# Patient Record
Sex: MALE | Race: WHITE | NOT HISPANIC OR LATINO | Employment: UNEMPLOYED | URBAN - METROPOLITAN AREA
[De-identification: names, ages, dates, MRNs, and addresses within clinical notes are randomized per-mention and may not be internally consistent; named-entity substitution may affect disease eponyms.]

---

## 2017-05-26 DIAGNOSIS — R53.1 WEAK: ICD-10-CM

## 2017-05-26 LAB — EKG IMPRESSION: NORMAL

## 2017-06-26 DIAGNOSIS — R07.89 CHEST WALL PAIN: ICD-10-CM

## 2017-06-30 LAB — EKG IMPRESSION: NORMAL

## 2018-02-19 ENCOUNTER — APPOINTMENT (OUTPATIENT)
Dept: RADIOLOGY | Facility: MEDICAL CENTER | Age: 58
DRG: 871 | End: 2018-02-19
Attending: EMERGENCY MEDICINE
Payer: COMMERCIAL

## 2018-02-19 ENCOUNTER — HOSPITAL ENCOUNTER (OUTPATIENT)
Dept: RADIOLOGY | Facility: MEDICAL CENTER | Age: 58
End: 2018-02-19

## 2018-02-19 ENCOUNTER — APPOINTMENT (OUTPATIENT)
Dept: RADIOLOGY | Facility: MEDICAL CENTER | Age: 58
DRG: 871 | End: 2018-02-19
Attending: INTERNAL MEDICINE
Payer: COMMERCIAL

## 2018-02-19 ENCOUNTER — RESOLUTE PROFESSIONAL BILLING HOSPITAL PROF FEE (OUTPATIENT)
Dept: HOSPITALIST | Facility: MEDICAL CENTER | Age: 58
End: 2018-02-19
Payer: COMMERCIAL

## 2018-02-19 ENCOUNTER — HOSPITAL ENCOUNTER (INPATIENT)
Facility: MEDICAL CENTER | Age: 58
LOS: 7 days | DRG: 871 | End: 2018-02-26
Attending: EMERGENCY MEDICINE | Admitting: INTERNAL MEDICINE
Payer: COMMERCIAL

## 2018-02-19 PROBLEM — J18.9 CAP (COMMUNITY ACQUIRED PNEUMONIA): Status: ACTIVE | Noted: 2018-02-19

## 2018-02-19 PROBLEM — I25.10 CAD (CORONARY ARTERY DISEASE): Status: ACTIVE | Noted: 2018-02-19

## 2018-02-19 PROBLEM — R74.01 TRANSAMINITIS: Status: ACTIVE | Noted: 2018-02-19

## 2018-02-19 PROBLEM — I50.9 ACUTE CHF (HCC): Status: ACTIVE | Noted: 2018-02-19

## 2018-02-19 PROBLEM — A41.9 SEPSIS (HCC): Status: ACTIVE | Noted: 2018-02-19

## 2018-02-19 LAB
ALBUMIN SERPL BCP-MCNC: 3.5 G/DL (ref 3.2–4.9)
ALBUMIN/GLOB SERPL: 1.1 G/DL
ALP SERPL-CCNC: 215 U/L (ref 30–99)
ALT SERPL-CCNC: 490 U/L (ref 2–50)
ANION GAP SERPL CALC-SCNC: 13 MMOL/L (ref 0–11.9)
APPEARANCE UR: CLEAR
AST SERPL-CCNC: 470 U/L (ref 12–45)
BASOPHILS # BLD AUTO: 0.5 % (ref 0–1.8)
BASOPHILS # BLD: 0.06 K/UL (ref 0–0.12)
BILIRUB SERPL-MCNC: 1.3 MG/DL (ref 0.1–1.5)
BILIRUB UR QL STRIP.AUTO: NEGATIVE
BNP SERPL-MCNC: 2730 PG/ML (ref 0–100)
BUN SERPL-MCNC: 14 MG/DL (ref 8–22)
CALCIUM SERPL-MCNC: 8.7 MG/DL (ref 8.5–10.5)
CHLORIDE SERPL-SCNC: 106 MMOL/L (ref 96–112)
CO2 SERPL-SCNC: 23 MMOL/L (ref 20–33)
COLOR UR: YELLOW
CREAT SERPL-MCNC: 0.96 MG/DL (ref 0.5–1.4)
EOSINOPHIL # BLD AUTO: 0.03 K/UL (ref 0–0.51)
EOSINOPHIL NFR BLD: 0.2 % (ref 0–6.9)
ERYTHROCYTE [DISTWIDTH] IN BLOOD BY AUTOMATED COUNT: 55.9 FL (ref 35.9–50)
GLOBULIN SER CALC-MCNC: 3.3 G/DL (ref 1.9–3.5)
GLUCOSE SERPL-MCNC: 90 MG/DL (ref 65–99)
GLUCOSE UR STRIP.AUTO-MCNC: NEGATIVE MG/DL
HCT VFR BLD AUTO: 40.6 % (ref 42–52)
HGB BLD-MCNC: 13.1 G/DL (ref 14–18)
IMM GRANULOCYTES # BLD AUTO: 0.09 K/UL (ref 0–0.11)
IMM GRANULOCYTES NFR BLD AUTO: 0.7 % (ref 0–0.9)
KETONES UR STRIP.AUTO-MCNC: NEGATIVE MG/DL
LACTATE BLD-SCNC: 2.1 MMOL/L (ref 0.5–2)
LACTATE BLD-SCNC: 2.6 MMOL/L (ref 0.5–2)
LACTATE BLD-SCNC: 2.8 MMOL/L (ref 0.5–2)
LEUKOCYTE ESTERASE UR QL STRIP.AUTO: NEGATIVE
LYMPHOCYTES # BLD AUTO: 1.03 K/UL (ref 1–4.8)
LYMPHOCYTES NFR BLD: 7.9 % (ref 22–41)
MCH RBC QN AUTO: 29.4 PG (ref 27–33)
MCHC RBC AUTO-ENTMCNC: 32.3 G/DL (ref 33.7–35.3)
MCV RBC AUTO: 91 FL (ref 81.4–97.8)
MICRO URNS: NORMAL
MONOCYTES # BLD AUTO: 0.92 K/UL (ref 0–0.85)
MONOCYTES NFR BLD AUTO: 7.1 % (ref 0–13.4)
NEUTROPHILS # BLD AUTO: 10.83 K/UL (ref 1.82–7.42)
NEUTROPHILS NFR BLD: 83.6 % (ref 44–72)
NITRITE UR QL STRIP.AUTO: NEGATIVE
NRBC # BLD AUTO: 0 K/UL
NRBC BLD-RTO: 0 /100 WBC
PH UR STRIP.AUTO: 7 [PH]
PLATELET # BLD AUTO: 269 K/UL (ref 164–446)
PMV BLD AUTO: 10.8 FL (ref 9–12.9)
POTASSIUM SERPL-SCNC: 4.4 MMOL/L (ref 3.6–5.5)
PROT SERPL-MCNC: 6.8 G/DL (ref 6–8.2)
PROT UR QL STRIP: NEGATIVE MG/DL
RBC # BLD AUTO: 4.46 M/UL (ref 4.7–6.1)
RBC UR QL AUTO: NEGATIVE
SODIUM SERPL-SCNC: 142 MMOL/L (ref 135–145)
SP GR UR STRIP.AUTO: 1.01
TROPONIN I SERPL-MCNC: 0.09 NG/ML (ref 0–0.04)
TROPONIN I SERPL-MCNC: 0.12 NG/ML (ref 0–0.04)
UROBILINOGEN UR STRIP.AUTO-MCNC: 0.2 MG/DL
WBC # BLD AUTO: 13 K/UL (ref 4.8–10.8)

## 2018-02-19 PROCEDURE — 700102 HCHG RX REV CODE 250 W/ 637 OVERRIDE(OP): Performed by: INTERNAL MEDICINE

## 2018-02-19 PROCEDURE — A9270 NON-COVERED ITEM OR SERVICE: HCPCS | Performed by: INTERNAL MEDICINE

## 2018-02-19 PROCEDURE — 80048 BASIC METABOLIC PNL TOTAL CA: CPT

## 2018-02-19 PROCEDURE — 99285 EMERGENCY DEPT VISIT HI MDM: CPT

## 2018-02-19 PROCEDURE — 700111 HCHG RX REV CODE 636 W/ 250 OVERRIDE (IP): Performed by: EMERGENCY MEDICINE

## 2018-02-19 PROCEDURE — 93005 ELECTROCARDIOGRAM TRACING: CPT | Performed by: EMERGENCY MEDICINE

## 2018-02-19 PROCEDURE — 304561 HCHG STAT O2

## 2018-02-19 PROCEDURE — 85025 COMPLETE CBC W/AUTO DIFF WBC: CPT

## 2018-02-19 PROCEDURE — 83605 ASSAY OF LACTIC ACID: CPT

## 2018-02-19 PROCEDURE — 700111 HCHG RX REV CODE 636 W/ 250 OVERRIDE (IP): Performed by: INTERNAL MEDICINE

## 2018-02-19 PROCEDURE — 99223 1ST HOSP IP/OBS HIGH 75: CPT | Mod: 25 | Performed by: INTERNAL MEDICINE

## 2018-02-19 PROCEDURE — 81003 URINALYSIS AUTO W/O SCOPE: CPT

## 2018-02-19 PROCEDURE — 96365 THER/PROPH/DIAG IV INF INIT: CPT

## 2018-02-19 PROCEDURE — 80053 COMPREHEN METABOLIC PANEL: CPT

## 2018-02-19 PROCEDURE — 71045 X-RAY EXAM CHEST 1 VIEW: CPT

## 2018-02-19 PROCEDURE — 700105 HCHG RX REV CODE 258: Performed by: EMERGENCY MEDICINE

## 2018-02-19 PROCEDURE — 87040 BLOOD CULTURE FOR BACTERIA: CPT

## 2018-02-19 PROCEDURE — 36415 COLL VENOUS BLD VENIPUNCTURE: CPT

## 2018-02-19 PROCEDURE — 87086 URINE CULTURE/COLONY COUNT: CPT

## 2018-02-19 PROCEDURE — 96361 HYDRATE IV INFUSION ADD-ON: CPT

## 2018-02-19 PROCEDURE — 99407 BEHAV CHNG SMOKING > 10 MIN: CPT | Performed by: INTERNAL MEDICINE

## 2018-02-19 PROCEDURE — 770020 HCHG ROOM/CARE - TELE (206)

## 2018-02-19 PROCEDURE — 700105 HCHG RX REV CODE 258: Performed by: INTERNAL MEDICINE

## 2018-02-19 PROCEDURE — A9270 NON-COVERED ITEM OR SERVICE: HCPCS | Performed by: EMERGENCY MEDICINE

## 2018-02-19 PROCEDURE — 84484 ASSAY OF TROPONIN QUANT: CPT

## 2018-02-19 PROCEDURE — 83880 ASSAY OF NATRIURETIC PEPTIDE: CPT

## 2018-02-19 PROCEDURE — 700102 HCHG RX REV CODE 250 W/ 637 OVERRIDE(OP): Performed by: EMERGENCY MEDICINE

## 2018-02-19 PROCEDURE — 80061 LIPID PANEL: CPT

## 2018-02-19 RX ORDER — ACETAMINOPHEN 325 MG/1
650 TABLET ORAL EVERY 6 HOURS PRN
Status: DISCONTINUED | OUTPATIENT
Start: 2018-02-19 | End: 2018-02-26 | Stop reason: HOSPADM

## 2018-02-19 RX ORDER — FUROSEMIDE 10 MG/ML
20 INJECTION INTRAMUSCULAR; INTRAVENOUS
Status: DISCONTINUED | OUTPATIENT
Start: 2018-02-19 | End: 2018-02-19

## 2018-02-19 RX ORDER — IPRATROPIUM BROMIDE AND ALBUTEROL SULFATE 2.5; .5 MG/3ML; MG/3ML
3 SOLUTION RESPIRATORY (INHALATION)
Status: DISCONTINUED | OUTPATIENT
Start: 2018-02-19 | End: 2018-02-26 | Stop reason: HOSPADM

## 2018-02-19 RX ORDER — OMEPRAZOLE 20 MG/1
20 CAPSULE, DELAYED RELEASE ORAL
Status: DISCONTINUED | OUTPATIENT
Start: 2018-02-19 | End: 2018-02-26 | Stop reason: HOSPADM

## 2018-02-19 RX ORDER — NICOTINE 21 MG/24HR
14 PATCH, TRANSDERMAL 24 HOURS TRANSDERMAL
Status: DISCONTINUED | OUTPATIENT
Start: 2018-02-20 | End: 2018-02-26 | Stop reason: HOSPADM

## 2018-02-19 RX ORDER — SODIUM CHLORIDE 9 MG/ML
500 INJECTION, SOLUTION INTRAVENOUS
Status: COMPLETED | OUTPATIENT
Start: 2018-02-19 | End: 2018-02-20

## 2018-02-19 RX ORDER — LEVOTHYROXINE SODIUM 137 UG/1
137 TABLET ORAL
Status: ON HOLD | COMMUNITY
End: 2018-02-26

## 2018-02-19 RX ORDER — ONDANSETRON 2 MG/ML
4 INJECTION INTRAMUSCULAR; INTRAVENOUS EVERY 4 HOURS PRN
Status: DISCONTINUED | OUTPATIENT
Start: 2018-02-19 | End: 2018-02-26 | Stop reason: HOSPADM

## 2018-02-19 RX ORDER — ASPIRIN 325 MG
325 TABLET ORAL ONCE
Status: COMPLETED | OUTPATIENT
Start: 2018-02-19 | End: 2018-02-19

## 2018-02-19 RX ORDER — AMOXICILLIN 250 MG
2 CAPSULE ORAL 2 TIMES DAILY
Status: DISCONTINUED | OUTPATIENT
Start: 2018-02-19 | End: 2018-02-26 | Stop reason: HOSPADM

## 2018-02-19 RX ORDER — SODIUM CHLORIDE 9 MG/ML
INJECTION, SOLUTION INTRAVENOUS
Status: ACTIVE
Start: 2018-02-19 | End: 2018-02-20

## 2018-02-19 RX ORDER — LEVOTHYROXINE SODIUM 0.1 MG/1
100 TABLET ORAL
Status: ON HOLD | COMMUNITY
End: 2018-02-26

## 2018-02-19 RX ORDER — SODIUM CHLORIDE 9 MG/ML
30 INJECTION, SOLUTION INTRAVENOUS ONCE
Status: COMPLETED | OUTPATIENT
Start: 2018-02-19 | End: 2018-02-19

## 2018-02-19 RX ORDER — HYDROCODONE BITARTRATE AND ACETAMINOPHEN 10; 325 MG/1; MG/1
1-2 TABLET ORAL EVERY 6 HOURS PRN
COMMUNITY
End: 2022-11-26

## 2018-02-19 RX ORDER — AZITHROMYCIN 250 MG/1
250 TABLET, FILM COATED ORAL DAILY
Status: COMPLETED | OUTPATIENT
Start: 2018-02-20 | End: 2018-02-23

## 2018-02-19 RX ORDER — TAMSULOSIN HYDROCHLORIDE 0.4 MG/1
0.4 CAPSULE ORAL
COMMUNITY

## 2018-02-19 RX ORDER — TAMSULOSIN HYDROCHLORIDE 0.4 MG/1
0.4 CAPSULE ORAL
Status: DISCONTINUED | OUTPATIENT
Start: 2018-02-19 | End: 2018-02-26 | Stop reason: HOSPADM

## 2018-02-19 RX ORDER — LEVOTHYROXINE SODIUM 0.1 MG/1
100 TABLET ORAL
Status: DISCONTINUED | OUTPATIENT
Start: 2018-02-20 | End: 2018-02-19

## 2018-02-19 RX ORDER — TERAZOSIN 2 MG/1
2 CAPSULE ORAL
Status: ON HOLD | COMMUNITY
End: 2018-02-26

## 2018-02-19 RX ORDER — BISACODYL 10 MG
10 SUPPOSITORY, RECTAL RECTAL
Status: DISCONTINUED | OUTPATIENT
Start: 2018-02-19 | End: 2018-02-26 | Stop reason: HOSPADM

## 2018-02-19 RX ORDER — POLYETHYLENE GLYCOL 3350 17 G/17G
1 POWDER, FOR SOLUTION ORAL
Status: DISCONTINUED | OUTPATIENT
Start: 2018-02-19 | End: 2018-02-26 | Stop reason: HOSPADM

## 2018-02-19 RX ORDER — SIMVASTATIN 20 MG
20 TABLET ORAL
Status: DISCONTINUED | OUTPATIENT
Start: 2018-02-19 | End: 2018-02-20

## 2018-02-19 RX ORDER — LEVOTHYROXINE SODIUM 137 UG/1
137 TABLET ORAL
Status: DISCONTINUED | OUTPATIENT
Start: 2018-02-20 | End: 2018-02-19

## 2018-02-19 RX ORDER — OMEPRAZOLE 20 MG/1
20 CAPSULE, DELAYED RELEASE ORAL 2 TIMES DAILY
COMMUNITY

## 2018-02-19 RX ORDER — AZITHROMYCIN 250 MG/1
500 TABLET, FILM COATED ORAL ONCE
Status: COMPLETED | OUTPATIENT
Start: 2018-02-19 | End: 2018-02-19

## 2018-02-19 RX ORDER — SIMVASTATIN 20 MG
20 TABLET ORAL
Status: ON HOLD | COMMUNITY
End: 2018-02-26

## 2018-02-19 RX ORDER — LEVOTHYROXINE SODIUM 0.1 MG/1
100 TABLET ORAL
Status: DISCONTINUED | OUTPATIENT
Start: 2018-02-19 | End: 2018-02-20

## 2018-02-19 RX ORDER — BUDESONIDE AND FORMOTEROL FUMARATE DIHYDRATE 80; 4.5 UG/1; UG/1
2 AEROSOL RESPIRATORY (INHALATION)
Status: DISCONTINUED | OUTPATIENT
Start: 2018-02-19 | End: 2018-02-20

## 2018-02-19 RX ORDER — FOLIC ACID 1 MG/1
1 TABLET ORAL DAILY
COMMUNITY

## 2018-02-19 RX ORDER — TIOTROPIUM BROMIDE 18 UG/1
1 CAPSULE ORAL; RESPIRATORY (INHALATION)
Status: DISCONTINUED | OUTPATIENT
Start: 2018-02-19 | End: 2018-02-20

## 2018-02-19 RX ORDER — FOLIC ACID 1 MG/1
5 TABLET ORAL DAILY
Status: DISCONTINUED | OUTPATIENT
Start: 2018-02-19 | End: 2018-02-26 | Stop reason: HOSPADM

## 2018-02-19 RX ORDER — FUROSEMIDE 10 MG/ML
20 INJECTION INTRAMUSCULAR; INTRAVENOUS
Status: DISCONTINUED | OUTPATIENT
Start: 2018-02-19 | End: 2018-02-20

## 2018-02-19 RX ORDER — GABAPENTIN 400 MG/1
1200 CAPSULE ORAL
COMMUNITY
End: 2022-11-26

## 2018-02-19 RX ORDER — LEVOTHYROXINE SODIUM 137 UG/1
137 TABLET ORAL
Status: DISCONTINUED | OUTPATIENT
Start: 2018-02-19 | End: 2018-02-20

## 2018-02-19 RX ORDER — ONDANSETRON 4 MG/1
4 TABLET, ORALLY DISINTEGRATING ORAL EVERY 4 HOURS PRN
Status: DISCONTINUED | OUTPATIENT
Start: 2018-02-19 | End: 2018-02-26 | Stop reason: HOSPADM

## 2018-02-19 RX ADMIN — OMEPRAZOLE 20 MG: 20 CAPSULE, DELAYED RELEASE ORAL at 20:08

## 2018-02-19 RX ADMIN — TAMSULOSIN HYDROCHLORIDE 0.4 MG: 0.4 CAPSULE ORAL at 20:08

## 2018-02-19 RX ADMIN — FOLIC ACID 5 MG: 1 TABLET ORAL at 20:09

## 2018-02-19 RX ADMIN — SIMVASTATIN 20 MG: 20 TABLET, FILM COATED ORAL at 20:08

## 2018-02-19 RX ADMIN — SODIUM CHLORIDE 1959 ML: 9 INJECTION, SOLUTION INTRAVENOUS at 16:00

## 2018-02-19 RX ADMIN — AZITHROMYCIN 500 MG: 250 TABLET, FILM COATED ORAL at 20:08

## 2018-02-19 RX ADMIN — ASPIRIN 325 MG: 325 TABLET, FILM COATED ORAL at 17:09

## 2018-02-19 RX ADMIN — STANDARDIZED SENNA CONCENTRATE AND DOCUSATE SODIUM 2 TABLET: 8.6; 5 TABLET, FILM COATED ORAL at 20:09

## 2018-02-19 RX ADMIN — AMPICILLIN SODIUM AND SULBACTAM SODIUM 3 G: 2; 1 INJECTION, POWDER, FOR SOLUTION INTRAMUSCULAR; INTRAVENOUS at 22:34

## 2018-02-19 RX ADMIN — AMPICILLIN SODIUM AND SULBACTAM SODIUM 3 G: 2; 1 INJECTION, POWDER, FOR SOLUTION INTRAMUSCULAR; INTRAVENOUS at 17:09

## 2018-02-19 RX ADMIN — ENOXAPARIN SODIUM 40 MG: 100 INJECTION SUBCUTANEOUS at 20:09

## 2018-02-19 RX ADMIN — FUROSEMIDE 20 MG: 10 INJECTION, SOLUTION INTRAMUSCULAR; INTRAVENOUS at 20:10

## 2018-02-19 ASSESSMENT — PAIN SCALES - GENERAL
PAINLEVEL_OUTOF10: 0
PAINLEVEL_OUTOF10: 0

## 2018-02-19 ASSESSMENT — LIFESTYLE VARIABLES
EVER_SMOKED: YES
ALCOHOL_USE: NO

## 2018-02-19 ASSESSMENT — PATIENT HEALTH QUESTIONNAIRE - PHQ9
1. LITTLE INTEREST OR PLEASURE IN DOING THINGS: NOT AT ALL
SUM OF ALL RESPONSES TO PHQ QUESTIONS 1-9: 0
2. FEELING DOWN, DEPRESSED, IRRITABLE, OR HOPELESS: NOT AT ALL
SUM OF ALL RESPONSES TO PHQ9 QUESTIONS 1 AND 2: 0

## 2018-02-19 NOTE — ED TRIAGE NOTES
Pt. Presents to ED via [facility] from [place] [facility] with Pneumonia and COPD exacerbation.... Pt. Arrives on BiPAP.... Pt. Taken off BiPAP on arrival and placed on Nasal Cannula.... Respirations even and unlabored. aao x 4    Pt. Received the following at [place]  40mg Lasix IV  Nitro Drip (taken off on arrival to [facility]    Pt. Has Villareal Catheter placed... 1500mL drained from Villareal bag.

## 2018-02-19 NOTE — ED PROVIDER NOTES
"ED Provider Note    Scribed for Mike Alaniz D.O. by Tony Schilling. 2/19/2018  3:33 PM    Primary care provider: No primary care provider on file.  Means of arrival: EMS  History obtained from: patient  History limited by: none    CHIEF COMPLAINT  Chief Complaint   Patient presents with   • Shortness of Breath       HPI  Phillip Ann is a 57 y.o. male with a history of COPD who presents to the Emergency Department as a transfer from [place] for evaluation of pneumonia and complaining of gradually worsening shortness of breath for the last 2 days. Patient repots associated mild subjective fever, facial swelling, bloating. He uses 2 L home oxygen and states that he has had to recently increase to 3 L which improved his shortness of breath. Patient was evaluated in [place], [place] and transferred to [facility] for more definitive care. He has a history of MI, daily cigarette use. Patient has not been compliant with his home medications. He denies abdominal pain, chest pain, cough, history of liver failure, history of hepatitis.     REVIEW OF SYSTEMS  Pertinent positives include shortness of breath, subjective fever, facial swelling, bloating. Pertinent negatives include no abdominal pain, chest pain, cough.  All other systems reviewed and negative.  C.    PAST MEDICAL HISTORY  COPD    SURGICAL HISTORY  None noted     SOCIAL HISTORY  Social History   Substance Use Topics   • Smoking status: Daily user   • Smokeless tobacco: None noted   • Alcohol use None noted      History   Drug use: None noted       FAMILY HISTORY  None noted    CURRENT MEDICATIONS  No current facility-administered medications for this encounter.   No current outpatient prescriptions on file.    ALLERGIES  Allergies   Allergen Reactions   • Hydromorphone Itching, Vomiting and Nausea       PHYSICAL EXAM  VITAL SIGNS: /89   Pulse (!) 104   Temp 36 °C (96.8 °F)   Resp 20   Ht 1.702 m (5' 7\")   Wt 65.3 kg (144 lb)   SpO2 96%   BMI 22.55 " kg/m²     Nursing notes and vitals reviewed.  Constitutional: Well developed, Well nourished, Non-toxic appearance. Slight distress  HENT: Dry mucousa.   Eyes: PERRLA, EOMI, Conjunctiva normal, No discharge.   Cardiovascular: Tachycardic heart rate, Normal rhythm, No murmurs, No rubs, No gallops.   Thorax & Lungs: No respiratory distress, No chest tenderness. Wheezes rales and rhonchi bilaterally. Dramatic decreased air movment bilaterally  Abdomen: Bowel sounds normal, Soft, No tenderness, No guarding, No rebound, No masses, No pulsatile masses.   Skin: Warm, Dry, No erythema, No rash.   Musculoskeletal: Intact distal pulses, No edema, No cyanosis, No clubbing. Good range of motion in all major joints. No tenderness to palpation or major deformities noted, no CVA tenderness, no midline back tenderness.   Neurologic: Alert & oriented x 3, Normal motor function, Normal sensory function, No focal deficits noted.  Psychiatric: Affect normal for clinical presentation.    DIAGNOSTIC STUDIES/PROCEDURES    LABS  Results for orders placed or performed during the hospital encounter of 02/19/18   LACTIC ACID   Result Value Ref Range    Lactic Acid 2.8 (H) 0.5 - 2.0 mmol/L   LACTIC ACID   Result Value Ref Range    Lactic Acid 2.6 (H) 0.5 - 2.0 mmol/L   CBC WITH DIFFERENTIAL   Result Value Ref Range    WBC 13.0 (H) 4.8 - 10.8 K/uL    RBC 4.46 (L) 4.70 - 6.10 M/uL    Hemoglobin 13.1 (L) 14.0 - 18.0 g/dL    Hematocrit 40.6 (L) 42.0 - 52.0 %    MCV 91.0 81.4 - 97.8 fL    MCH 29.4 27.0 - 33.0 pg    MCHC 32.3 (L) 33.7 - 35.3 g/dL    RDW 55.9 (H) 35.9 - 50.0 fL    Platelet Count 269 164 - 446 K/uL    MPV 10.8 9.0 - 12.9 fL    Neutrophils-Polys 83.60 (H) 44.00 - 72.00 %    Lymphocytes 7.90 (L) 22.00 - 41.00 %    Monocytes 7.10 0.00 - 13.40 %    Eosinophils 0.20 0.00 - 6.90 %    Basophils 0.50 0.00 - 1.80 %    Immature Granulocytes 0.70 0.00 - 0.90 %    Nucleated RBC 0.00 /100 WBC    Neutrophils (Absolute) 10.83 (H) 1.82 - 7.42 K/uL     Lymphs (Absolute) 1.03 1.00 - 4.80 K/uL    Monos (Absolute) 0.92 (H) 0.00 - 0.85 K/uL    Eos (Absolute) 0.03 0.00 - 0.51 K/uL    Baso (Absolute) 0.06 0.00 - 0.12 K/uL    Immature Granulocytes (abs) 0.09 0.00 - 0.11 K/uL    NRBC (Absolute) 0.00 K/uL   COMP METABOLIC PANEL   Result Value Ref Range    Sodium 142 135 - 145 mmol/L    Potassium 4.4 3.6 - 5.5 mmol/L    Chloride 106 96 - 112 mmol/L    Co2 23 20 - 33 mmol/L    Anion Gap 13.0 (H) 0.0 - 11.9    Glucose 90 65 - 99 mg/dL    Bun 14 8 - 22 mg/dL    Creatinine 0.96 0.50 - 1.40 mg/dL    Calcium 8.7 8.5 - 10.5 mg/dL    AST(SGOT) 470 (H) 12 - 45 U/L    ALT(SGPT) 490 (H) 2 - 50 U/L    Alkaline Phosphatase 215 (H) 30 - 99 U/L    Total Bilirubin 1.3 0.1 - 1.5 mg/dL    Albumin 3.5 3.2 - 4.9 g/dL    Total Protein 6.8 6.0 - 8.2 g/dL    Globulin 3.3 1.9 - 3.5 g/dL    A-G Ratio 1.1 g/dL   URINALYSIS   Result Value Ref Range    Color Yellow     Character Clear     Specific Gravity 1.007 <1.035    Ph 7.0 5.0 - 8.0    Glucose Negative Negative mg/dL    Ketones Negative Negative mg/dL    Protein Negative Negative mg/dL    Bilirubin Negative Negative    Urobilinogen, Urine 0.2 Negative    Nitrite Negative Negative    Leukocyte Esterase Negative Negative    Occult Blood Negative Negative    Micro Urine Req see below    TROPONIN   Result Value Ref Range    Troponin I 0.09 (H) 0.00 - 0.04 ng/mL   BTYPE NATRIURETIC PEPTIDE   Result Value Ref Range    B Natriuretic Peptide 2730 (H) 0 - 100 pg/mL   ESTIMATED GFR   Result Value Ref Range    GFR If African American >60 >60 mL/min/1.73 m 2    GFR If Non African American >60 >60 mL/min/1.73 m 2   All labs reviewed by me.    RADIOLOGY  DX-CHEST-PORTABLE (1 VIEW)   Final Result      Enlarged cardiac silhouette with bilateral pleural effusions.      Consolidation in the left lung likely within the superior segment of the left lower lobe suggestive of pneumonia versus asymmetric pulmonary edema.      OUTSIDE IMAGES-DX CHEST   Final Result       Echocardiogram Comp W/O Cont    (Results Pending)   US-ABDOMEN COMPLETE SURVEY    (Results Pending)   The radiologist's interpretation of all radiological studies have been reviewed by me.    COURSE & MEDICAL DECISION MAKING  Pertinent Labs & Imaging studies reviewed. (See chart for details)    Review of transfer medical records shows Lactic acid 3.3. Alk phos 2.7. ALT 6.4 . Bilirubin 4.3. Troponin 0.038. Reviewed EKG completed today x2. CBC. WBC 12602. No Xray results.     3:33 PM - Patient seen and examined at bedside. Patient will be treated with NS 1959 ml for dehydration. Ordered DX chest, Lactic acid x2, Troponin, BNP, CBC with differential, CMP, Urinalysis, Urine culture, Blood culture x2 to evaluate his symptoms.     4:51 PM - I discussed the patient's case and the above findings with Dr. Guerrero (hospitalist) who agrees to admit the patient.     This is a charming 57 y.o. male that presents with pneumonia and possible heart failure. The patient has received antibiotics here in the emergency department eformity Unasyn. Currently, the patient is hypoxic, he has no evidence of severe hemolytic instability requiring vasopressors but he does have an elevated lactic acid consistent with sepsis. The patient has received IV antibiotics, blood cultures have been drawn, patient will be admitted for further evaluation and management.    DISPOSITION:  Patient will be admitted to hosptial in critical condition.    CRITICAL CARE  The very real possibilty of a deterioration of this patient's condition required the highest level of my preparedness for sudden, emergent intervention.  I provided critical care services, which included medication orders, frequent reevaluations of the patient's condition and response to treatment, ordering and reviewing test results, and discussing the case with various consultants.  The critical care time associated with the care of the patient was 35 minutes. Review chart for  interventions. This time is exclusive of any other billable procedures.     FINAL IMPRESSION  Pneumonia  Sepsis  Critical care time 35 minutes as above.      I, Tony Schilling (Scribe), am scribing for, and in the presence of, Mike Alaniz D.O    Electronically signed by: Tony Schilling (Scribe), 2/19/2018    IMike D.O. personally performed the services described in this documentation, as scribed by Tony Schilling in my presence, and it is both accurate and complete.    The note accurately reflects work and decisions made by me.  Mike Alaniz  2/19/2018  10:30 PM

## 2018-02-20 ENCOUNTER — APPOINTMENT (OUTPATIENT)
Dept: RADIOLOGY | Facility: MEDICAL CENTER | Age: 58
DRG: 871 | End: 2018-02-20
Attending: INTERNAL MEDICINE
Payer: COMMERCIAL

## 2018-02-20 PROBLEM — E03.9 HYPOTHYROID: Status: ACTIVE | Noted: 2018-02-20

## 2018-02-20 PROBLEM — R79.89 ELEVATED TROPONIN: Status: ACTIVE | Noted: 2018-02-20

## 2018-02-20 PROBLEM — J90 PLEURAL EFFUSION: Status: ACTIVE | Noted: 2018-02-20

## 2018-02-20 PROBLEM — Z72.0 TOBACCO ABUSE: Status: ACTIVE | Noted: 2018-02-20

## 2018-02-20 PROBLEM — J96.20 ACUTE AND CHRONIC RESPIRATORY FAILURE (ACUTE-ON-CHRONIC) (HCC): Status: ACTIVE | Noted: 2018-02-20

## 2018-02-20 PROBLEM — J44.9 COPD (CHRONIC OBSTRUCTIVE PULMONARY DISEASE) (HCC): Status: ACTIVE | Noted: 2018-02-20

## 2018-02-20 PROBLEM — Z72.0 TOBACCO ABUSE: Chronic | Status: ACTIVE | Noted: 2018-02-20

## 2018-02-20 LAB
ALBUMIN SERPL BCP-MCNC: 3.3 G/DL (ref 3.2–4.9)
ALBUMIN/GLOB SERPL: 1.2 G/DL
ALP SERPL-CCNC: 224 U/L (ref 30–99)
ALT SERPL-CCNC: 393 U/L (ref 2–50)
ANION GAP SERPL CALC-SCNC: 11 MMOL/L (ref 0–11.9)
ANION GAP SERPL CALC-SCNC: 8 MMOL/L (ref 0–11.9)
APTT PPP: 33 SEC (ref 24.7–36)
AST SERPL-CCNC: 237 U/L (ref 12–45)
BASOPHILS # BLD AUTO: 0.8 % (ref 0–1.8)
BASOPHILS # BLD: 0.1 K/UL (ref 0–0.12)
BILIRUB SERPL-MCNC: 1.2 MG/DL (ref 0.1–1.5)
BUN SERPL-MCNC: 17 MG/DL (ref 8–22)
BUN SERPL-MCNC: 18 MG/DL (ref 8–22)
CALCIUM SERPL-MCNC: 8.5 MG/DL (ref 8.5–10.5)
CALCIUM SERPL-MCNC: 8.6 MG/DL (ref 8.5–10.5)
CHLORIDE SERPL-SCNC: 101 MMOL/L (ref 96–112)
CHLORIDE SERPL-SCNC: 103 MMOL/L (ref 96–112)
CHOLEST SERPL-MCNC: 86 MG/DL (ref 100–199)
CO2 SERPL-SCNC: 24 MMOL/L (ref 20–33)
CO2 SERPL-SCNC: 28 MMOL/L (ref 20–33)
CREAT SERPL-MCNC: 1.12 MG/DL (ref 0.5–1.4)
CREAT SERPL-MCNC: 1.15 MG/DL (ref 0.5–1.4)
EKG IMPRESSION: NORMAL
EKG IMPRESSION: NORMAL
EOSINOPHIL # BLD AUTO: 0.71 K/UL (ref 0–0.51)
EOSINOPHIL NFR BLD: 5.6 % (ref 0–6.9)
ERYTHROCYTE [DISTWIDTH] IN BLOOD BY AUTOMATED COUNT: 54.2 FL (ref 35.9–50)
EST. AVERAGE GLUCOSE BLD GHB EST-MCNC: 117 MG/DL
GLOBULIN SER CALC-MCNC: 2.8 G/DL (ref 1.9–3.5)
GLUCOSE SERPL-MCNC: 88 MG/DL (ref 65–99)
GLUCOSE SERPL-MCNC: 97 MG/DL (ref 65–99)
HAV IGM SERPL QL IA: NEGATIVE
HBA1C MFR BLD: 5.7 % (ref 0–5.6)
HBV CORE IGM SER QL: NEGATIVE
HBV SURFACE AG SER QL: NEGATIVE
HCT VFR BLD AUTO: 37.5 % (ref 42–52)
HCV AB SER QL: NEGATIVE
HDLC SERPL-MCNC: 27 MG/DL
HGB BLD-MCNC: 12.1 G/DL (ref 14–18)
IMM GRANULOCYTES # BLD AUTO: 0.08 K/UL (ref 0–0.11)
IMM GRANULOCYTES NFR BLD AUTO: 0.6 % (ref 0–0.9)
INR PPP: 1.26 (ref 0.87–1.13)
LDLC SERPL CALC-MCNC: 47 MG/DL
LV EJECT FRACT  99904: 20
LV EJECT FRACT MOD 2C 99903: 39.5
LV EJECT FRACT MOD 4C 99902: 18.86
LV EJECT FRACT MOD BP 99901: 27.33
LYMPHOCYTES # BLD AUTO: 1.49 K/UL (ref 1–4.8)
LYMPHOCYTES NFR BLD: 11.8 % (ref 22–41)
MAGNESIUM SERPL-MCNC: 1.7 MG/DL (ref 1.5–2.5)
MCH RBC QN AUTO: 28.9 PG (ref 27–33)
MCHC RBC AUTO-ENTMCNC: 32.3 G/DL (ref 33.7–35.3)
MCV RBC AUTO: 89.5 FL (ref 81.4–97.8)
MONOCYTES # BLD AUTO: 0.8 K/UL (ref 0–0.85)
MONOCYTES NFR BLD AUTO: 6.4 % (ref 0–13.4)
NEUTROPHILS # BLD AUTO: 9.4 K/UL (ref 1.82–7.42)
NEUTROPHILS NFR BLD: 74.8 % (ref 44–72)
NRBC # BLD AUTO: 0 K/UL
NRBC BLD-RTO: 0 /100 WBC
PHOSPHATE SERPL-MCNC: 2.5 MG/DL (ref 2.5–4.5)
PLATELET # BLD AUTO: 149 K/UL (ref 164–446)
PMV BLD AUTO: 11.4 FL (ref 9–12.9)
POTASSIUM SERPL-SCNC: 3.7 MMOL/L (ref 3.6–5.5)
POTASSIUM SERPL-SCNC: 4 MMOL/L (ref 3.6–5.5)
PROCALCITONIN SERPL-MCNC: 0.18 NG/ML
PROT SERPL-MCNC: 6.1 G/DL (ref 6–8.2)
PROTHROMBIN TIME: 15.5 SEC (ref 12–14.6)
RBC # BLD AUTO: 4.19 M/UL (ref 4.7–6.1)
SODIUM SERPL-SCNC: 137 MMOL/L (ref 135–145)
SODIUM SERPL-SCNC: 138 MMOL/L (ref 135–145)
TRIGL SERPL-MCNC: 62 MG/DL (ref 0–149)
TROPONIN I SERPL-MCNC: 0.14 NG/ML (ref 0–0.04)
TSH SERPL DL<=0.005 MIU/L-ACNC: 81.42 UIU/ML (ref 0.38–5.33)
WBC # BLD AUTO: 12.6 K/UL (ref 4.8–10.8)

## 2018-02-20 PROCEDURE — 700111 HCHG RX REV CODE 636 W/ 250 OVERRIDE (IP): Performed by: FAMILY MEDICINE

## 2018-02-20 PROCEDURE — 85610 PROTHROMBIN TIME: CPT

## 2018-02-20 PROCEDURE — 84145 PROCALCITONIN (PCT): CPT

## 2018-02-20 PROCEDURE — 93306 TTE W/DOPPLER COMPLETE: CPT | Mod: 26 | Performed by: INTERNAL MEDICINE

## 2018-02-20 PROCEDURE — 700102 HCHG RX REV CODE 250 W/ 637 OVERRIDE(OP): Performed by: INTERNAL MEDICINE

## 2018-02-20 PROCEDURE — 93010 ELECTROCARDIOGRAM REPORT: CPT | Performed by: INTERNAL MEDICINE

## 2018-02-20 PROCEDURE — 700105 HCHG RX REV CODE 258: Performed by: INTERNAL MEDICINE

## 2018-02-20 PROCEDURE — A9270 NON-COVERED ITEM OR SERVICE: HCPCS | Performed by: FAMILY MEDICINE

## 2018-02-20 PROCEDURE — 90686 IIV4 VACC NO PRSV 0.5 ML IM: CPT | Performed by: INTERNAL MEDICINE

## 2018-02-20 PROCEDURE — 84443 ASSAY THYROID STIM HORMONE: CPT

## 2018-02-20 PROCEDURE — 93005 ELECTROCARDIOGRAM TRACING: CPT | Performed by: INTERNAL MEDICINE

## 2018-02-20 PROCEDURE — 85730 THROMBOPLASTIN TIME PARTIAL: CPT

## 2018-02-20 PROCEDURE — 80074 ACUTE HEPATITIS PANEL: CPT

## 2018-02-20 PROCEDURE — A9270 NON-COVERED ITEM OR SERVICE: HCPCS

## 2018-02-20 PROCEDURE — 3E0234Z INTRODUCTION OF SERUM, TOXOID AND VACCINE INTO MUSCLE, PERCUTANEOUS APPROACH: ICD-10-PCS | Performed by: INTERNAL MEDICINE

## 2018-02-20 PROCEDURE — 76700 US EXAM ABDOM COMPLETE: CPT

## 2018-02-20 PROCEDURE — A9270 NON-COVERED ITEM OR SERVICE: HCPCS | Performed by: INTERNAL MEDICINE

## 2018-02-20 PROCEDURE — 700102 HCHG RX REV CODE 250 W/ 637 OVERRIDE(OP): Performed by: FAMILY MEDICINE

## 2018-02-20 PROCEDURE — 84100 ASSAY OF PHOSPHORUS: CPT

## 2018-02-20 PROCEDURE — 36415 COLL VENOUS BLD VENIPUNCTURE: CPT

## 2018-02-20 PROCEDURE — 90471 IMMUNIZATION ADMIN: CPT

## 2018-02-20 PROCEDURE — 700102 HCHG RX REV CODE 250 W/ 637 OVERRIDE(OP)

## 2018-02-20 PROCEDURE — 93306 TTE W/DOPPLER COMPLETE: CPT

## 2018-02-20 PROCEDURE — 99233 SBSQ HOSP IP/OBS HIGH 50: CPT | Performed by: FAMILY MEDICINE

## 2018-02-20 PROCEDURE — 770020 HCHG ROOM/CARE - TELE (206)

## 2018-02-20 PROCEDURE — 83735 ASSAY OF MAGNESIUM: CPT

## 2018-02-20 PROCEDURE — 83036 HEMOGLOBIN GLYCOSYLATED A1C: CPT

## 2018-02-20 PROCEDURE — 80053 COMPREHEN METABOLIC PANEL: CPT

## 2018-02-20 PROCEDURE — 85025 COMPLETE CBC W/AUTO DIFF WBC: CPT

## 2018-02-20 PROCEDURE — 84484 ASSAY OF TROPONIN QUANT: CPT

## 2018-02-20 PROCEDURE — 700111 HCHG RX REV CODE 636 W/ 250 OVERRIDE (IP): Performed by: INTERNAL MEDICINE

## 2018-02-20 RX ORDER — POTASSIUM CHLORIDE 20 MEQ/1
20 TABLET, EXTENDED RELEASE ORAL DAILY
Status: DISCONTINUED | OUTPATIENT
Start: 2018-02-20 | End: 2018-02-21

## 2018-02-20 RX ORDER — TIOTROPIUM BROMIDE 18 UG/1
1 CAPSULE ORAL; RESPIRATORY (INHALATION) DAILY
Status: DISCONTINUED | OUTPATIENT
Start: 2018-02-20 | End: 2018-02-26 | Stop reason: HOSPADM

## 2018-02-20 RX ORDER — CALCIUM CARBONATE 500 MG/1
1000 TABLET, CHEWABLE ORAL 3 TIMES DAILY PRN
Status: DISCONTINUED | OUTPATIENT
Start: 2018-02-20 | End: 2018-02-26 | Stop reason: HOSPADM

## 2018-02-20 RX ORDER — ASPIRIN 325 MG
325 TABLET ORAL DAILY
Status: COMPLETED | OUTPATIENT
Start: 2018-02-20 | End: 2018-02-20

## 2018-02-20 RX ORDER — ASPIRIN 81 MG/1
81 TABLET, CHEWABLE ORAL DAILY
Status: DISCONTINUED | OUTPATIENT
Start: 2018-02-20 | End: 2018-02-26 | Stop reason: HOSPADM

## 2018-02-20 RX ORDER — BUDESONIDE AND FORMOTEROL FUMARATE DIHYDRATE 80; 4.5 UG/1; UG/1
2 AEROSOL RESPIRATORY (INHALATION) 2 TIMES DAILY
Status: DISCONTINUED | OUTPATIENT
Start: 2018-02-20 | End: 2018-02-26 | Stop reason: HOSPADM

## 2018-02-20 RX ORDER — FUROSEMIDE 10 MG/ML
40 INJECTION INTRAMUSCULAR; INTRAVENOUS
Status: DISCONTINUED | OUTPATIENT
Start: 2018-02-20 | End: 2018-02-21

## 2018-02-20 RX ADMIN — STANDARDIZED SENNA CONCENTRATE AND DOCUSATE SODIUM 2 TABLET: 8.6; 5 TABLET, FILM COATED ORAL at 09:00

## 2018-02-20 RX ADMIN — FUROSEMIDE 20 MG: 10 INJECTION, SOLUTION INTRAMUSCULAR; INTRAVENOUS at 09:00

## 2018-02-20 RX ADMIN — STANDARDIZED SENNA CONCENTRATE AND DOCUSATE SODIUM 2 TABLET: 8.6; 5 TABLET, FILM COATED ORAL at 21:44

## 2018-02-20 RX ADMIN — POTASSIUM CHLORIDE 20 MEQ: 1500 TABLET, EXTENDED RELEASE ORAL at 17:16

## 2018-02-20 RX ADMIN — NICOTINE 14 MG: 14 PATCH, EXTENDED RELEASE TRANSDERMAL at 06:11

## 2018-02-20 RX ADMIN — OMEPRAZOLE 20 MG: 20 CAPSULE, DELAYED RELEASE ORAL at 06:10

## 2018-02-20 RX ADMIN — AMPICILLIN SODIUM AND SULBACTAM SODIUM 3 G: 2; 1 INJECTION, POWDER, FOR SOLUTION INTRAMUSCULAR; INTRAVENOUS at 23:30

## 2018-02-20 RX ADMIN — INFLUENZA A VIRUS A/MICHIGAN/45/2015 X-275 (H1N1) ANTIGEN (FORMALDEHYDE INACTIVATED), INFLUENZA A VIRUS A/HONG KONG/4801/2014 X-263B (H3N2) ANTIGEN (FORMALDEHYDE INACTIVATED), INFLUENZA B VIRUS B/PHUKET/3073/2013 ANTIGEN (FORMALDEHYDE INACTIVATED), AND INFLUENZA B VIRUS B/BRISBANE/60/2008 ANTIGEN (FORMALDEHYDE INACTIVATED) 0.5 ML: 15; 15; 15; 15 INJECTION, SUSPENSION INTRAMUSCULAR at 06:12

## 2018-02-20 RX ADMIN — TIOTROPIUM BROMIDE 1 CAPSULE: 18 CAPSULE ORAL; RESPIRATORY (INHALATION) at 08:57

## 2018-02-20 RX ADMIN — FUROSEMIDE 40 MG: 10 INJECTION, SOLUTION INTRAMUSCULAR; INTRAVENOUS at 17:16

## 2018-02-20 RX ADMIN — MAGNESIUM GLUCONATE 500 MG ORAL TABLET 400 MG: 500 TABLET ORAL at 21:44

## 2018-02-20 RX ADMIN — ASPIRIN 325 MG: 325 TABLET ORAL at 02:03

## 2018-02-20 RX ADMIN — LEVOTHYROXINE SODIUM 137 MCG: 137 TABLET ORAL at 06:16

## 2018-02-20 RX ADMIN — TAMSULOSIN HYDROCHLORIDE 0.4 MG: 0.4 CAPSULE ORAL at 21:44

## 2018-02-20 RX ADMIN — MAGNESIUM GLUCONATE 500 MG ORAL TABLET 400 MG: 500 TABLET ORAL at 09:03

## 2018-02-20 RX ADMIN — AMPICILLIN SODIUM AND SULBACTAM SODIUM 3 G: 2; 1 INJECTION, POWDER, FOR SOLUTION INTRAMUSCULAR; INTRAVENOUS at 11:54

## 2018-02-20 RX ADMIN — AZITHROMYCIN 250 MG: 250 TABLET, FILM COATED ORAL at 18:15

## 2018-02-20 RX ADMIN — BUDESONIDE AND FORMOTEROL FUMARATE DIHYDRATE 2 PUFF: 80; 4.5 AEROSOL RESPIRATORY (INHALATION) at 08:57

## 2018-02-20 RX ADMIN — LEVOTHYROXINE SODIUM 100 MCG: 100 TABLET ORAL at 06:12

## 2018-02-20 RX ADMIN — BUDESONIDE AND FORMOTEROL FUMARATE DIHYDRATE 2 PUFF: 80; 4.5 AEROSOL RESPIRATORY (INHALATION) at 21:43

## 2018-02-20 RX ADMIN — ASPIRIN 81 MG: 81 TABLET, CHEWABLE ORAL at 09:00

## 2018-02-20 RX ADMIN — ENOXAPARIN SODIUM 40 MG: 100 INJECTION SUBCUTANEOUS at 17:16

## 2018-02-20 RX ADMIN — FOLIC ACID 5 MG: 1 TABLET ORAL at 08:59

## 2018-02-20 RX ADMIN — AMPICILLIN SODIUM AND SULBACTAM SODIUM 3 G: 2; 1 INJECTION, POWDER, FOR SOLUTION INTRAMUSCULAR; INTRAVENOUS at 05:00

## 2018-02-20 RX ADMIN — OMEPRAZOLE 20 MG: 20 CAPSULE, DELAYED RELEASE ORAL at 17:16

## 2018-02-20 RX ADMIN — AMPICILLIN SODIUM AND SULBACTAM SODIUM 3 G: 2; 1 INJECTION, POWDER, FOR SOLUTION INTRAMUSCULAR; INTRAVENOUS at 17:16

## 2018-02-20 ASSESSMENT — ENCOUNTER SYMPTOMS
COUGH: 0
ABDOMINAL PAIN: 0
COUGH: 1
HEARTBURN: 0
CHILLS: 1
DIZZINESS: 0
WHEEZING: 0
FEVER: 0
ORTHOPNEA: 1
CHILLS: 0
SHORTNESS OF BREATH: 1
FEVER: 1
DIARRHEA: 0
SORE THROAT: 0
PALPITATIONS: 0
LOSS OF CONSCIOUSNESS: 0
NAUSEA: 0
BLURRED VISION: 0
SPUTUM PRODUCTION: 1
BLOOD IN STOOL: 0
VOMITING: 0
WEAKNESS: 1

## 2018-02-20 ASSESSMENT — PAIN SCALES - GENERAL
PAINLEVEL_OUTOF10: 0

## 2018-02-20 ASSESSMENT — COPD QUESTIONNAIRES
COPD SCREENING SCORE: 5
DO YOU EVER COUGH UP ANY MUCUS OR PHLEGM?: NO/ONLY WITH OCCASIONAL COLDS OR INFECTIONS
DO YOU EVER COUGH UP ANY MUCUS OR PHLEGM?: NO/ONLY WITH OCCASIONAL COLDS OR INFECTIONS
HAVE YOU SMOKED AT LEAST 100 CIGARETTES IN YOUR ENTIRE LIFE: YES
COPD SCREENING SCORE: 6
DURING THE PAST 4 WEEKS HOW MUCH DID YOU FEEL SHORT OF BREATH: SOME OF THE TIME
DURING THE PAST 4 WEEKS HOW MUCH DID YOU FEEL SHORT OF BREATH: SOME OF THE TIME
HAVE YOU SMOKED AT LEAST 100 CIGARETTES IN YOUR ENTIRE LIFE: YES

## 2018-02-20 ASSESSMENT — LIFESTYLE VARIABLES: EVER_SMOKED: YES

## 2018-02-20 NOTE — PROGRESS NOTES
MD Schultz paged with lab result of troponin of 0.12. Pt nonsymptomatic.  Order for EKG received and to repeate trop in 6 h.

## 2018-02-20 NOTE — H&P
Hospital Medicine History and Physical    Date of Service  2/19/2018    Chief Complaint  Chief Complaint   Patient presents with   • Shortness of Breath       History of Presenting Illness  57 y.o. male with a past medical history of COPD on 2 L of oxygen who was transferred from an outlying facility for worsening shortness of breath and productive cough for the past 2 days. Patient also reports fever and chills. He reports orthopnea and abdominal swelling. He denies any chest pain or swelling of his lower extremities. Patient reports a history of open heart surgery for valve repair. He does not know which valve was repaired. In the ER he was found to be tachycardic and hypoxic, he was placed on 4 L of oxygen. WBC count is 13, lactic acid of 2.8, BNP 2730, troponin 0.09. [facility]        Primary Care Physician  Ana Flores M.D.    Consultants  None    Code Status  Full code    Review of Systems  Review of Systems   Constitutional: Positive for chills and fever. Negative for malaise/fatigue.   Respiratory: Positive for cough, sputum production and shortness of breath. Negative for wheezing.    Cardiovascular: Positive for orthopnea. Negative for chest pain, palpitations and leg swelling.   Gastrointestinal: Negative for abdominal pain, blood in stool, diarrhea, nausea and vomiting.   Genitourinary: Negative for dysuria.   Neurological: Negative for dizziness and loss of consciousness.   All other systems reviewed and are negative.       Past Medical History  Hypertension  COPD  Hypothyroidism  Hyperlipidemia  GERD  Surgical History  Open-heart surgery for valve repair    Medications  Neurontin 1200 mg every bedtime   Hytrin 2 mg every bedtime   Norco 10/3/25 milligrams 1-2 tabs every 6 hours when necessary for pain   Brio ellipta1 puff every day   Incruz ellipta 1 puff every day   Folic acid 5 mg daily   Zocor 20 mg every bedtime   Prilosec 20 mg twice a day   Flomax 0.4  million and a bedtime   Synthroid to 37 MCG daily     Family History  History reviewed. No pertinent family history  Social History  Current every day smoker, smokes half a pack per day. Smoked for the past 45 years  Denies alcohol or drug use    Allergies  Allergies   Allergen Reactions   • Hydromorphone Itching, Vomiting and Nausea        Physical Exam  Laboratory   Hemodynamics  Temp (24hrs), Av.6 °C (97.8 °F), Min:36 °C (96.8 °F), Max:37 °C (98.6 °F)   Temperature: 36.6 °C (97.9 °F)  Pulse  Av.4  Min: 104  Max: 114 Heart Rate (Monitored): (!) 111  Blood Pressure: 129/94, NIBP: 134/89      Respiratory      Respiration: 20, Pulse Oximetry: 97 %, O2 Daily Delivery Respiratory : Silicone Nasal Cannula        RUL Breath Sounds: Clear, RML Breath Sounds: Clear, RLL Breath Sounds: Diminished, EZE Breath Sounds: Clear, LLL Breath Sounds: Diminished    Physical Exam   Constitutional: He is oriented to person, place, and time. He appears well-developed and well-nourished. No distress.   HENT:   Head: Normocephalic and atraumatic.   Eyes: Conjunctivae are normal. Pupils are equal, round, and reactive to light.   Neck: Neck supple.   Cardiovascular: Regular rhythm.    Tachycardic   Pulmonary/Chest: He has no wheezes.   Increased effort  Bilateral crackles and rhonchi     Abdominal: Soft. Bowel sounds are normal. He exhibits no distension. There is no tenderness.   Musculoskeletal: Normal range of motion. He exhibits no edema or tenderness.   Neurological: He is alert and oriented to person, place, and time. No cranial nerve deficit. Coordination normal.   Skin: Skin is warm.   Psychiatric: He has a normal mood and affect.   Nursing note and vitals reviewed.      Recent Labs      18   1514   WBC  13.0*   RBC  4.46*   HEMOGLOBIN  13.1*   HEMATOCRIT  40.6*   MCV  91.0   MCH  29.4   MCHC  32.3*   RDW  55.9*   PLATELETCT  269   MPV  10.8     Recent Labs      18   1514   SODIUM  142   POTASSIUM  4.4    CHLORIDE  106   CO2  23   GLUCOSE  90   BUN  14   CREATININE  0.96   CALCIUM  8.7     Recent Labs      02/19/18   1514   ALTSGPT  490*   ASTSGOT  470*   ALKPHOSPHAT  215*   TBILIRUBIN  1.3   GLUCOSE  90         Recent Labs      02/19/18   1514   BNPBTYPENAT  2730*         Lab Results   Component Value Date    TROPONINI 0.12 (H) 02/19/2018     Urinalysis:    Lab Results  Component Value Date/Time   SPECGRAVITY 1.007 02/19/2018 1600   GLUCOSEUR Negative 02/19/2018 1600   KETONES Negative 02/19/2018 1600   NITRITE Negative 02/19/2018 1600        Imaging  DX-CHEST-PORTABLE (1 VIEW)   Final Result      Enlarged cardiac silhouette with bilateral pleural effusions.      Consolidation in the left lung likely within the superior segment of the left lower lobe suggestive of pneumonia versus asymmetric pulmonary edema.      OUTSIDE IMAGES-DX CHEST   Final Result      Echocardiogram Comp W/O Cont    (Results Pending)   US-ABDOMEN COMPLETE SURVEY    (Results Pending)        Assessment/Plan     I anticipate this patient will require at least two midnights for appropriate medical management, necessitating inpatient admission.    Acute CHF ([facility])- (present on admission)   Assessment & Plan    Progressive dyspnea with orthopnea, elevated BNP and CXR revealing pulmonary edema   I will start the patient on IV lasix 20 mg daily  Pt denies active CP, troponin elevation is likely secondary to acute CHF, however we will continue to trend troponin  Strict I/O, fluid and salt restriction  2-D echo ordered  Telemetry monitoring  Patient reports history of valve repair, we'll need to get medical records         Sepsis (CMS-HCC)- (present on admission)   Assessment & Plan    This is sepsis (without associated acute organ dysfunction).   Source is pneumonia  Patient is in acute CHF and clinically appears volume overloaded. He has received 2 L of IVF in the ER  I will stop IVF at this time and initiate gentle diuresis  Lactate has improved  from 2.8 > 2.6, will continue to trend to resolution  Started on IV Ceftriaxone and Azithromycin  Check procalcitonin  Follow blood cultures         Tobacco abuse   Assessment & Plan    Tobacco cessation education provided for more than 10 minutes, discussed options of nicotine patch, medical treatment with wellbutrin and chantix. Discussed the benefits of quitting smoking. Nicotine replacement protocol will be provided to the patient.          Elevated troponin- (present on admission)   Assessment & Plan    Likely secondary to demand ischemia in the setting of acute congestive heart failure  Patient denies any active chest pain  Patient has been given full dose of aspirin, I will continue him on statin  Continue to trend troponin  Telemetry monitoring        Transaminitis- (present on admission)   Assessment & Plan    Likely secondary to congestive hepatopathy  Patient has been started on IV Lasix  Patient denies alcohol use  I will order ultrasound of abdomen for further evaluation  Ordered hepatitis panel        CAP (community acquired pneumonia)- (present on admission)   Assessment & Plan    Patient has been started on IV Unasyn and azithromycin  Follow blood and respiratory cultures  RT protocol and supplemental oxygen            VTE prophylaxis: Lovenox.

## 2018-02-20 NOTE — PROGRESS NOTES
[facility] Hospitalist Progress Note    Date of Service: 2018    Chief Complaint  57 y.o. male admitted 2018 with Sepsis, Pneumonia, Respiratory failure, CHF exacerbation.    Interval Problem Update  PNA - feels a little better  Resp failure - baseline O2 2 lpm, requiring 4 lpm  CHF - noted 2017, history of valve replacement/repair?  Hypothyroid - TSH 81    Consultants/Specialty  None    Disposition  TBD        Review of Systems   Constitutional: Positive for malaise/fatigue. Negative for chills and fever.   HENT: Negative for hearing loss and sore throat.    Eyes: Negative for blurred vision.   Respiratory: Positive for shortness of breath. Negative for cough and wheezing.    Cardiovascular: Negative for chest pain and leg swelling.   Gastrointestinal: Negative for abdominal pain, diarrhea, heartburn, nausea and vomiting.   Genitourinary: Negative for dysuria.   Neurological: Positive for weakness. Negative for dizziness.      Physical Exam  Laboratory/Imaging   Hemodynamics  Temp (24hrs), Av.6 °C (97.8 °F), Min:36 °C (96.8 °F), Max:37 °C (98.6 °F)   Temperature: 36.5 °C (97.7 °F)  Pulse  Av.5  Min: 104  Max: 115 Heart Rate (Monitored): (!) 111  Blood Pressure: 123/89, NIBP: 134/89      Respiratory      Respiration: 17, Pulse Oximetry: 97 %, O2 Daily Delivery Respiratory : Silicone Nasal Cannula        RUL Breath Sounds: Clear, RML Breath Sounds: Clear, RLL Breath Sounds: Diminished, EZE Breath Sounds: Clear, LLL Breath Sounds: Diminished    Fluids    Intake/Output Summary (Last 24 hours) at 18 1453  Last data filed at 18 0700   Gross per 24 hour   Intake              680 ml   Output             2000 ml   Net            -1320 ml       Nutrition  Orders Placed This Encounter   Procedures   • Diet Order     Standing Status:   Standing     Number of Occurrences:   1     Order Specific Question:   Diet:     Answer:   Cardiac [6]     Order Specific Question:   Diet:     Answer:   2 Gram  Sodium [7]     Order Specific Question:   Consistency/Fluid modifications:     Answer:   1200 ml Fluid Restriction [8]     Physical Exam   Constitutional: He is oriented to person, place, and time. He appears well-developed and well-nourished.   HENT:   Head: Normocephalic and atraumatic.   Eyes: Conjunctivae are normal. Pupils are equal, round, and reactive to light.   Neck: No tracheal deviation present. No thyromegaly present.   Cardiovascular: Normal rate and regular rhythm.    Pulmonary/Chest: Effort normal. He has decreased breath sounds in the right lower field and the left lower field. He has rales.   Abdominal: Soft. Bowel sounds are normal. He exhibits distension. There is no tenderness. There is no rebound and no guarding.   Musculoskeletal: He exhibits no edema.   Lymphadenopathy:     He has no cervical adenopathy.   Neurological: He is alert and oriented to person, place, and time.   Skin: Skin is warm and dry.   Nursing note and vitals reviewed.      Recent Labs      02/19/18   1514  02/20/18   0412   WBC  13.0*  12.6*   RBC  4.46*  4.19*   HEMOGLOBIN  13.1*  12.1*   HEMATOCRIT  40.6*  37.5*   MCV  91.0  89.5   MCH  29.4  28.9   MCHC  32.3*  32.3*   RDW  55.9*  54.2*   PLATELETCT  269  149*   MPV  10.8  11.4     Recent Labs      02/19/18   0412  02/19/18   1514  02/20/18   0840   SODIUM  138  142  137   POTASSIUM  3.7  4.4  4.0   CHLORIDE  103  106  101   CO2  24  23  28   GLUCOSE  88  90  97   BUN  17  14  18   CREATININE  1.15  0.96  1.12   CALCIUM  8.5  8.7  8.6     Recent Labs      02/20/18   0514   APTT  33.0   INR  1.26*     Recent Labs      02/19/18   1514   BNPBTYPENAT  2730*     Recent Labs      02/19/18   0412   TRIGLYCERIDE  62   HDL  27*   LDL  47          Assessment/Plan     * CAP (community acquired pneumonia)- (present on admission)   Assessment & Plan    IV Unasyn and Azithromycin          Acute and chronic respiratory failure (acute-on-chronic) (CMS-HCC)- (present on admission)    Assessment & Plan    Keep O2 sats above 90%        Sepsis (CMS-HCC)- (present on admission)   Assessment & Plan    This is sepsis (without associated acute organ dysfunction).   Source is pneumonia          CHF, acute on chronic (CMS-HCC)- (present on admission)   Assessment & Plan    Increase IV Lasix  Echo pending        Pleural effusion- (present on admission)   Assessment & Plan    IV Lasix        Hypothyroid- (present on admission)   Assessment & Plan    Change to IV Synthroid        COPD (chronic obstructive pulmonary disease) (CMS-HCC)- (present on admission)   Assessment & Plan    Spiriva, Symbicort, RT protocol        Elevated troponin- (present on admission)   Assessment & Plan    ASA, Echo pending        Transaminitis- (present on admission)   Assessment & Plan    Likely secondary to hepatic congestion  Follow cmp        Tobacco abuse- (present on admission)   Assessment & Plan    Nicotine replacement             Quality-Core Measures   Reviewed items::  EKG reviewed, Radiology images reviewed, Labs reviewed and Medications reviewed  Villareal catheter::  Urinary Tract Retention or Urinary Tract Obstruction  DVT prophylaxis pharmacological::  Enoxaparin (Lovenox)  Ulcer Prophylaxis::  Yes  Antibiotics:  Treating active infection/contamination beyond 24 hours perioperative coverage

## 2018-02-20 NOTE — PROGRESS NOTES
2 RN skin check  Ears red, blnching  Right knee dry scab  Left great toe nail removed, dry scab  No open wounds noted

## 2018-02-20 NOTE — ED NOTES
Med rec updated and complete.  Allergies reviewed.  Pt unsure of medications or strengths.  Placed call to pts home to verify all medications.  Pt verified last doses taken.  No antibiotic use in last 30 days.

## 2018-02-20 NOTE — PROGRESS NOTES
Report received.  Assumed Care. Assessment performed. All LDL assessed. Pt in bed, no family present. AOx4, responds appropriately.  Denies pain. No signs of distress, no SOB.  Plan of care discussed.  Explained importance of calling before getting OOB and pt verbalizes understanding.  Call light and belongings within reach, treaded slipper socks on, bed in lowest position.

## 2018-02-21 ENCOUNTER — PATIENT OUTREACH (OUTPATIENT)
Dept: HEALTH INFORMATION MANAGEMENT | Facility: OTHER | Age: 58
End: 2018-02-21

## 2018-02-21 PROBLEM — Z95.2 S/P MVR (MITRAL VALVE REPLACEMENT): Status: ACTIVE | Noted: 2018-02-21

## 2018-02-21 PROBLEM — E83.42 HYPOMAGNESEMIA: Status: ACTIVE | Noted: 2018-02-21

## 2018-02-21 PROBLEM — N28.9 RENAL INSUFFICIENCY: Status: ACTIVE | Noted: 2018-02-21

## 2018-02-21 LAB
ALBUMIN SERPL BCP-MCNC: 3.2 G/DL (ref 3.2–4.9)
ALBUMIN/GLOB SERPL: 1 G/DL
ALP SERPL-CCNC: 189 U/L (ref 30–99)
ALT SERPL-CCNC: 297 U/L (ref 2–50)
ANION GAP SERPL CALC-SCNC: 8 MMOL/L (ref 0–11.9)
AST SERPL-CCNC: 136 U/L (ref 12–45)
BACTERIA UR CULT: NORMAL
BASOPHILS # BLD AUTO: 0.6 % (ref 0–1.8)
BASOPHILS # BLD: 0.08 K/UL (ref 0–0.12)
BILIRUB SERPL-MCNC: 0.8 MG/DL (ref 0.1–1.5)
BNP SERPL-MCNC: 1113 PG/ML (ref 0–100)
BUN SERPL-MCNC: 23 MG/DL (ref 8–22)
CALCIUM SERPL-MCNC: 8.7 MG/DL (ref 8.5–10.5)
CHLORIDE SERPL-SCNC: 100 MMOL/L (ref 96–112)
CO2 SERPL-SCNC: 29 MMOL/L (ref 20–33)
CREAT SERPL-MCNC: 1.39 MG/DL (ref 0.5–1.4)
EOSINOPHIL # BLD AUTO: 0.94 K/UL (ref 0–0.51)
EOSINOPHIL NFR BLD: 7.5 % (ref 0–6.9)
ERYTHROCYTE [DISTWIDTH] IN BLOOD BY AUTOMATED COUNT: 51.3 FL (ref 35.9–50)
GLOBULIN SER CALC-MCNC: 3.1 G/DL (ref 1.9–3.5)
GLUCOSE SERPL-MCNC: 115 MG/DL (ref 65–99)
HCT VFR BLD AUTO: 36.8 % (ref 42–52)
HGB BLD-MCNC: 12.1 G/DL (ref 14–18)
IMM GRANULOCYTES # BLD AUTO: 0.05 K/UL (ref 0–0.11)
IMM GRANULOCYTES NFR BLD AUTO: 0.4 % (ref 0–0.9)
LYMPHOCYTES # BLD AUTO: 1.27 K/UL (ref 1–4.8)
LYMPHOCYTES NFR BLD: 10.1 % (ref 22–41)
MCH RBC QN AUTO: 28.7 PG (ref 27–33)
MCHC RBC AUTO-ENTMCNC: 32.9 G/DL (ref 33.7–35.3)
MCV RBC AUTO: 87.2 FL (ref 81.4–97.8)
MONOCYTES # BLD AUTO: 1 K/UL (ref 0–0.85)
MONOCYTES NFR BLD AUTO: 8 % (ref 0–13.4)
NEUTROPHILS # BLD AUTO: 9.23 K/UL (ref 1.82–7.42)
NEUTROPHILS NFR BLD: 73.4 % (ref 44–72)
NRBC # BLD AUTO: 0 K/UL
NRBC BLD-RTO: 0 /100 WBC
PLATELET # BLD AUTO: 238 K/UL (ref 164–446)
PMV BLD AUTO: 10.4 FL (ref 9–12.9)
POTASSIUM SERPL-SCNC: 3.7 MMOL/L (ref 3.6–5.5)
PROT SERPL-MCNC: 6.3 G/DL (ref 6–8.2)
RBC # BLD AUTO: 4.22 M/UL (ref 4.7–6.1)
SIGNIFICANT IND 70042: NORMAL
SITE SITE: NORMAL
SODIUM SERPL-SCNC: 137 MMOL/L (ref 135–145)
SOURCE SOURCE: NORMAL
WBC # BLD AUTO: 12.6 K/UL (ref 4.8–10.8)

## 2018-02-21 PROCEDURE — 80053 COMPREHEN METABOLIC PANEL: CPT

## 2018-02-21 PROCEDURE — 700111 HCHG RX REV CODE 636 W/ 250 OVERRIDE (IP): Performed by: FAMILY MEDICINE

## 2018-02-21 PROCEDURE — 700102 HCHG RX REV CODE 250 W/ 637 OVERRIDE(OP): Performed by: INTERNAL MEDICINE

## 2018-02-21 PROCEDURE — 36415 COLL VENOUS BLD VENIPUNCTURE: CPT

## 2018-02-21 PROCEDURE — 770020 HCHG ROOM/CARE - TELE (206)

## 2018-02-21 PROCEDURE — 700101 HCHG RX REV CODE 250: Performed by: FAMILY MEDICINE

## 2018-02-21 PROCEDURE — 99407 BEHAV CHNG SMOKING > 10 MIN: CPT

## 2018-02-21 PROCEDURE — A9270 NON-COVERED ITEM OR SERVICE: HCPCS | Performed by: INTERNAL MEDICINE

## 2018-02-21 PROCEDURE — 85025 COMPLETE CBC W/AUTO DIFF WBC: CPT

## 2018-02-21 PROCEDURE — 700105 HCHG RX REV CODE 258: Performed by: INTERNAL MEDICINE

## 2018-02-21 PROCEDURE — 700111 HCHG RX REV CODE 636 W/ 250 OVERRIDE (IP): Performed by: INTERNAL MEDICINE

## 2018-02-21 PROCEDURE — 700102 HCHG RX REV CODE 250 W/ 637 OVERRIDE(OP): Performed by: FAMILY MEDICINE

## 2018-02-21 PROCEDURE — 83880 ASSAY OF NATRIURETIC PEPTIDE: CPT

## 2018-02-21 PROCEDURE — A9270 NON-COVERED ITEM OR SERVICE: HCPCS | Performed by: FAMILY MEDICINE

## 2018-02-21 PROCEDURE — 99233 SBSQ HOSP IP/OBS HIGH 50: CPT | Performed by: FAMILY MEDICINE

## 2018-02-21 RX ORDER — CARVEDILOL 3.12 MG/1
3.12 TABLET ORAL 2 TIMES DAILY WITH MEALS
Status: DISCONTINUED | OUTPATIENT
Start: 2018-02-21 | End: 2018-02-24

## 2018-02-21 RX ORDER — POTASSIUM CHLORIDE 20 MEQ/1
20 TABLET, EXTENDED RELEASE ORAL 2 TIMES DAILY
Status: DISCONTINUED | OUTPATIENT
Start: 2018-02-21 | End: 2018-02-24

## 2018-02-21 RX ORDER — FUROSEMIDE 10 MG/ML
20 INJECTION INTRAMUSCULAR; INTRAVENOUS
Status: DISCONTINUED | OUTPATIENT
Start: 2018-02-21 | End: 2018-02-24

## 2018-02-21 RX ORDER — METOPROLOL SUCCINATE 25 MG/1
25 TABLET, EXTENDED RELEASE ORAL
Status: DISCONTINUED | OUTPATIENT
Start: 2018-02-21 | End: 2018-02-21

## 2018-02-21 RX ADMIN — OMEPRAZOLE 20 MG: 20 CAPSULE, DELAYED RELEASE ORAL at 06:11

## 2018-02-21 RX ADMIN — ANTACID TABLETS 1000 MG: 500 TABLET, CHEWABLE ORAL at 00:05

## 2018-02-21 RX ADMIN — ACETAMINOPHEN 650 MG: 325 TABLET, FILM COATED ORAL at 02:23

## 2018-02-21 RX ADMIN — BUDESONIDE AND FORMOTEROL FUMARATE DIHYDRATE 2 PUFF: 80; 4.5 AEROSOL RESPIRATORY (INHALATION) at 08:31

## 2018-02-21 RX ADMIN — FOLIC ACID 5 MG: 1 TABLET ORAL at 08:31

## 2018-02-21 RX ADMIN — FUROSEMIDE 40 MG: 10 INJECTION, SOLUTION INTRAMUSCULAR; INTRAVENOUS at 05:34

## 2018-02-21 RX ADMIN — TIOTROPIUM BROMIDE 1 CAPSULE: 18 CAPSULE ORAL; RESPIRATORY (INHALATION) at 08:32

## 2018-02-21 RX ADMIN — ENOXAPARIN SODIUM 40 MG: 100 INJECTION SUBCUTANEOUS at 18:22

## 2018-02-21 RX ADMIN — STANDARDIZED SENNA CONCENTRATE AND DOCUSATE SODIUM 2 TABLET: 8.6; 5 TABLET, FILM COATED ORAL at 20:43

## 2018-02-21 RX ADMIN — MAGNESIUM GLUCONATE 500 MG ORAL TABLET 400 MG: 500 TABLET ORAL at 20:43

## 2018-02-21 RX ADMIN — CARVEDILOL 3.12 MG: 3.12 TABLET, FILM COATED ORAL at 08:30

## 2018-02-21 RX ADMIN — AMPICILLIN SODIUM AND SULBACTAM SODIUM 3 G: 2; 1 INJECTION, POWDER, FOR SOLUTION INTRAMUSCULAR; INTRAVENOUS at 05:34

## 2018-02-21 RX ADMIN — AZITHROMYCIN 250 MG: 250 TABLET, FILM COATED ORAL at 18:22

## 2018-02-21 RX ADMIN — MAGNESIUM GLUCONATE 500 MG ORAL TABLET 400 MG: 500 TABLET ORAL at 08:34

## 2018-02-21 RX ADMIN — STANDARDIZED SENNA CONCENTRATE AND DOCUSATE SODIUM 2 TABLET: 8.6; 5 TABLET, FILM COATED ORAL at 08:39

## 2018-02-21 RX ADMIN — LEVOTHYROXINE SODIUM ANHYDROUS 100 MCG: 100 INJECTION, POWDER, LYOPHILIZED, FOR SOLUTION INTRAVENOUS at 08:42

## 2018-02-21 RX ADMIN — NICOTINE 14 MG: 14 PATCH, EXTENDED RELEASE TRANSDERMAL at 05:34

## 2018-02-21 RX ADMIN — AMPICILLIN SODIUM AND SULBACTAM SODIUM 3 G: 2; 1 INJECTION, POWDER, FOR SOLUTION INTRAMUSCULAR; INTRAVENOUS at 16:50

## 2018-02-21 RX ADMIN — OMEPRAZOLE 20 MG: 20 CAPSULE, DELAYED RELEASE ORAL at 16:51

## 2018-02-21 RX ADMIN — POTASSIUM CHLORIDE 20 MEQ: 1500 TABLET, EXTENDED RELEASE ORAL at 08:33

## 2018-02-21 RX ADMIN — POTASSIUM CHLORIDE 20 MEQ: 1500 TABLET, EXTENDED RELEASE ORAL at 20:43

## 2018-02-21 RX ADMIN — TAMSULOSIN HYDROCHLORIDE 0.4 MG: 0.4 CAPSULE ORAL at 20:43

## 2018-02-21 RX ADMIN — BUDESONIDE AND FORMOTEROL FUMARATE DIHYDRATE 2 PUFF: 80; 4.5 AEROSOL RESPIRATORY (INHALATION) at 20:42

## 2018-02-21 RX ADMIN — AMPICILLIN SODIUM AND SULBACTAM SODIUM 3 G: 2; 1 INJECTION, POWDER, FOR SOLUTION INTRAMUSCULAR; INTRAVENOUS at 22:33

## 2018-02-21 RX ADMIN — CARVEDILOL 3.12 MG: 3.12 TABLET, FILM COATED ORAL at 16:51

## 2018-02-21 RX ADMIN — FUROSEMIDE 20 MG: 10 INJECTION, SOLUTION INTRAMUSCULAR; INTRAVENOUS at 16:51

## 2018-02-21 RX ADMIN — AMPICILLIN SODIUM AND SULBACTAM SODIUM 3 G: 2; 1 INJECTION, POWDER, FOR SOLUTION INTRAMUSCULAR; INTRAVENOUS at 12:10

## 2018-02-21 RX ADMIN — ASPIRIN 81 MG: 81 TABLET, CHEWABLE ORAL at 08:30

## 2018-02-21 ASSESSMENT — ENCOUNTER SYMPTOMS
NAUSEA: 0
SORE THROAT: 0
BLURRED VISION: 0
DIARRHEA: 0
HEARTBURN: 0
CHILLS: 0
WHEEZING: 0
SHORTNESS OF BREATH: 1
WEAKNESS: 1
COUGH: 0
FEVER: 0
DIZZINESS: 0
VOMITING: 0
ABDOMINAL PAIN: 0

## 2018-02-21 ASSESSMENT — PAIN SCALES - GENERAL
PAINLEVEL_OUTOF10: 0
PAINLEVEL_OUTOF10: 0
PAINLEVEL_OUTOF10: 3

## 2018-02-21 NOTE — PROGRESS NOTES
Assessment unchanged, patient resting comfortably in bed.  Spoke to physician about patients swain, physician states that he wants to keep the swain due to significant retention.  RN asks physician about changing swain catheter to our swain. He states no, leave the outside facility swain for now.

## 2018-02-21 NOTE — RESPIRATORY CARE
"COPD EDUCATION by COPD CLINICAL EDUCATOR  2/21/2018  at  12:43 PM by Alicja Garza     Patient interviewed by COPD education team.  Patient unable to participate in full program.  Short intervention has been conducted.  A comprehensive packet including information about COPD, treatments, and smoking cessation given.  Also,  After long discussion provided smoking cessation packet with \"Tips to Quit\" and flyer for \"Free Smoking Cessation Classes\".  "

## 2018-02-21 NOTE — PROGRESS NOTES
Bedside report completed.  Assumed pt care.  Pt AAO x4, resting in bed comfortably with no signs of labored breathing.  Pt tele monitor in place, cardiac rhythm being monitored.  Pt call light within reach, bed in low position, non skid socks in place.  Pt denies any pain or other distress at this time.  Patient needs addressed.  Patient declines any additional needs at this time.

## 2018-02-21 NOTE — CARE PLAN
Problem: Safety  Goal: Will remain free from injury    Intervention: Provide assistance with mobility  RN educated patient regarding the importance of calling for assistance to decrease the risk of injury due to falls while patient is on bedrest.  Patient verbalized understanding of education and denies any questions at this time.        Problem: Bowel/Gastric:  Goal: Will not experience complications related to bowel motility    Intervention: Assess baseline bowel pattern  RN educated patient regarding the importance of regular bowel movements.  Patient verbalized understanding of education and denies any questions at this time.

## 2018-02-21 NOTE — PROGRESS NOTES
"Patient stated he had \"heartburn\" and requested antacid.  RN notified MD.  New orders received.    "

## 2018-02-21 NOTE — PROGRESS NOTES
Pt feels he is breathing better, no complaints of pain, tolerates diet, continue to monitor intake and output

## 2018-02-22 ENCOUNTER — APPOINTMENT (OUTPATIENT)
Dept: RADIOLOGY | Facility: MEDICAL CENTER | Age: 58
DRG: 871 | End: 2018-02-22
Attending: FAMILY MEDICINE
Payer: COMMERCIAL

## 2018-02-22 ENCOUNTER — APPOINTMENT (OUTPATIENT)
Dept: RADIOLOGY | Facility: MEDICAL CENTER | Age: 58
DRG: 871 | End: 2018-02-22
Attending: HOSPITALIST
Payer: COMMERCIAL

## 2018-02-22 LAB
ALBUMIN SERPL BCP-MCNC: 3.5 G/DL (ref 3.2–4.9)
ALBUMIN/GLOB SERPL: 1.1 G/DL
ALP SERPL-CCNC: 214 U/L (ref 30–99)
ALT SERPL-CCNC: 250 U/L (ref 2–50)
AMPHET UR QL SCN: NEGATIVE
ANION GAP SERPL CALC-SCNC: 8 MMOL/L (ref 0–11.9)
ANION GAP SERPL CALC-SCNC: 8 MMOL/L (ref 0–11.9)
AST SERPL-CCNC: 80 U/L (ref 12–45)
BARBITURATES UR QL SCN: NEGATIVE
BASOPHILS # BLD AUTO: 0.8 % (ref 0–1.8)
BASOPHILS # BLD: 0.1 K/UL (ref 0–0.12)
BENZODIAZ UR QL SCN: NEGATIVE
BILIRUB SERPL-MCNC: 0.8 MG/DL (ref 0.1–1.5)
BNP SERPL-MCNC: 1509 PG/ML (ref 0–100)
BUN SERPL-MCNC: 24 MG/DL (ref 8–22)
BUN SERPL-MCNC: 26 MG/DL (ref 8–22)
BZE UR QL SCN: NEGATIVE
CALCIUM SERPL-MCNC: 8.9 MG/DL (ref 8.5–10.5)
CALCIUM SERPL-MCNC: 9 MG/DL (ref 8.5–10.5)
CANNABINOIDS UR QL SCN: NEGATIVE
CHLORIDE SERPL-SCNC: 97 MMOL/L (ref 96–112)
CHLORIDE SERPL-SCNC: 98 MMOL/L (ref 96–112)
CO2 SERPL-SCNC: 31 MMOL/L (ref 20–33)
CO2 SERPL-SCNC: 32 MMOL/L (ref 20–33)
CREAT SERPL-MCNC: 1.16 MG/DL (ref 0.5–1.4)
CREAT SERPL-MCNC: 1.26 MG/DL (ref 0.5–1.4)
EOSINOPHIL # BLD AUTO: 0.97 K/UL (ref 0–0.51)
EOSINOPHIL NFR BLD: 7.9 % (ref 0–6.9)
ERYTHROCYTE [DISTWIDTH] IN BLOOD BY AUTOMATED COUNT: 52.7 FL (ref 35.9–50)
ERYTHROCYTE [DISTWIDTH] IN BLOOD BY AUTOMATED COUNT: 54.4 FL (ref 35.9–50)
GLOBULIN SER CALC-MCNC: 3.2 G/DL (ref 1.9–3.5)
GLUCOSE SERPL-MCNC: 100 MG/DL (ref 65–99)
GLUCOSE SERPL-MCNC: 107 MG/DL (ref 65–99)
HCT VFR BLD AUTO: 40.3 % (ref 42–52)
HCT VFR BLD AUTO: 43.1 % (ref 42–52)
HGB BLD-MCNC: 12.9 G/DL (ref 14–18)
HGB BLD-MCNC: 13.6 G/DL (ref 14–18)
IMM GRANULOCYTES # BLD AUTO: 0.06 K/UL (ref 0–0.11)
IMM GRANULOCYTES NFR BLD AUTO: 0.5 % (ref 0–0.9)
INR PPP: 1.06 (ref 0.87–1.13)
LYMPHOCYTES # BLD AUTO: 1.39 K/UL (ref 1–4.8)
LYMPHOCYTES NFR BLD: 11.3 % (ref 22–41)
MCH RBC QN AUTO: 28.2 PG (ref 27–33)
MCH RBC QN AUTO: 28.3 PG (ref 27–33)
MCHC RBC AUTO-ENTMCNC: 31.6 G/DL (ref 33.7–35.3)
MCHC RBC AUTO-ENTMCNC: 32 G/DL (ref 33.7–35.3)
MCV RBC AUTO: 88 FL (ref 81.4–97.8)
MCV RBC AUTO: 89.6 FL (ref 81.4–97.8)
METHADONE UR QL SCN: NEGATIVE
MONOCYTES # BLD AUTO: 0.97 K/UL (ref 0–0.85)
MONOCYTES NFR BLD AUTO: 7.9 % (ref 0–13.4)
NEUTROPHILS # BLD AUTO: 8.84 K/UL (ref 1.82–7.42)
NEUTROPHILS NFR BLD: 71.6 % (ref 44–72)
NRBC # BLD AUTO: 0 K/UL
NRBC BLD-RTO: 0 /100 WBC
OPIATES UR QL SCN: NEGATIVE
OXYCODONE UR QL SCN: NEGATIVE
PCP UR QL SCN: NEGATIVE
PLATELET # BLD AUTO: 262 K/UL (ref 164–446)
PLATELET # BLD AUTO: 294 K/UL (ref 164–446)
PMV BLD AUTO: 10.2 FL (ref 9–12.9)
PMV BLD AUTO: 10.3 FL (ref 9–12.9)
POTASSIUM SERPL-SCNC: 3.9 MMOL/L (ref 3.6–5.5)
POTASSIUM SERPL-SCNC: 4.1 MMOL/L (ref 3.6–5.5)
PROPOXYPH UR QL SCN: NEGATIVE
PROT SERPL-MCNC: 6.7 G/DL (ref 6–8.2)
PROTHROMBIN TIME: 13.5 SEC (ref 12–14.6)
RBC # BLD AUTO: 4.58 M/UL (ref 4.7–6.1)
RBC # BLD AUTO: 4.81 M/UL (ref 4.7–6.1)
SODIUM SERPL-SCNC: 137 MMOL/L (ref 135–145)
SODIUM SERPL-SCNC: 137 MMOL/L (ref 135–145)
WBC # BLD AUTO: 12.2 K/UL (ref 4.8–10.8)
WBC # BLD AUTO: 12.3 K/UL (ref 4.8–10.8)

## 2018-02-22 PROCEDURE — 80048 BASIC METABOLIC PNL TOTAL CA: CPT

## 2018-02-22 PROCEDURE — 700102 HCHG RX REV CODE 250 W/ 637 OVERRIDE(OP): Performed by: INTERNAL MEDICINE

## 2018-02-22 PROCEDURE — 700101 HCHG RX REV CODE 250: Performed by: FAMILY MEDICINE

## 2018-02-22 PROCEDURE — 83880 ASSAY OF NATRIURETIC PEPTIDE: CPT

## 2018-02-22 PROCEDURE — A9270 NON-COVERED ITEM OR SERVICE: HCPCS | Performed by: INTERNAL MEDICINE

## 2018-02-22 PROCEDURE — 700105 HCHG RX REV CODE 258: Performed by: HOSPITALIST

## 2018-02-22 PROCEDURE — 85027 COMPLETE CBC AUTOMATED: CPT

## 2018-02-22 PROCEDURE — 71046 X-RAY EXAM CHEST 2 VIEWS: CPT

## 2018-02-22 PROCEDURE — 700111 HCHG RX REV CODE 636 W/ 250 OVERRIDE (IP): Performed by: INTERNAL MEDICINE

## 2018-02-22 PROCEDURE — 80053 COMPREHEN METABOLIC PANEL: CPT

## 2018-02-22 PROCEDURE — 85025 COMPLETE CBC W/AUTO DIFF WBC: CPT

## 2018-02-22 PROCEDURE — 700102 HCHG RX REV CODE 250 W/ 637 OVERRIDE(OP): Performed by: FAMILY MEDICINE

## 2018-02-22 PROCEDURE — 770020 HCHG ROOM/CARE - TELE (206)

## 2018-02-22 PROCEDURE — 80307 DRUG TEST PRSMV CHEM ANLYZR: CPT

## 2018-02-22 PROCEDURE — 700111 HCHG RX REV CODE 636 W/ 250 OVERRIDE (IP): Performed by: FAMILY MEDICINE

## 2018-02-22 PROCEDURE — A9270 NON-COVERED ITEM OR SERVICE: HCPCS | Performed by: FAMILY MEDICINE

## 2018-02-22 PROCEDURE — 85610 PROTHROMBIN TIME: CPT

## 2018-02-22 PROCEDURE — 36415 COLL VENOUS BLD VENIPUNCTURE: CPT

## 2018-02-22 PROCEDURE — 99233 SBSQ HOSP IP/OBS HIGH 50: CPT | Performed by: FAMILY MEDICINE

## 2018-02-22 PROCEDURE — 700105 HCHG RX REV CODE 258: Performed by: INTERNAL MEDICINE

## 2018-02-22 PROCEDURE — 73030 X-RAY EXAM OF SHOULDER: CPT | Mod: LT

## 2018-02-22 RX ORDER — OXYCODONE HYDROCHLORIDE 5 MG/1
5-10 TABLET ORAL EVERY 4 HOURS PRN
Status: DISCONTINUED | OUTPATIENT
Start: 2018-02-22 | End: 2018-02-22

## 2018-02-22 RX ORDER — OXYCODONE HYDROCHLORIDE 10 MG/1
10 TABLET ORAL EVERY 4 HOURS PRN
Status: DISCONTINUED | OUTPATIENT
Start: 2018-02-22 | End: 2018-02-26 | Stop reason: HOSPADM

## 2018-02-22 RX ORDER — SODIUM CHLORIDE 9 MG/ML
INJECTION, SOLUTION INTRAVENOUS CONTINUOUS
Status: DISCONTINUED | OUTPATIENT
Start: 2018-02-22 | End: 2018-02-23

## 2018-02-22 RX ORDER — OXYCODONE HYDROCHLORIDE 5 MG/1
5 TABLET ORAL EVERY 4 HOURS PRN
Status: DISCONTINUED | OUTPATIENT
Start: 2018-02-22 | End: 2018-02-26 | Stop reason: HOSPADM

## 2018-02-22 RX ADMIN — BUDESONIDE AND FORMOTEROL FUMARATE DIHYDRATE 2 PUFF: 80; 4.5 AEROSOL RESPIRATORY (INHALATION) at 08:51

## 2018-02-22 RX ADMIN — SODIUM CHLORIDE: 9 INJECTION, SOLUTION INTRAVENOUS at 20:42

## 2018-02-22 RX ADMIN — AMPICILLIN SODIUM AND SULBACTAM SODIUM 3 G: 2; 1 INJECTION, POWDER, FOR SOLUTION INTRAMUSCULAR; INTRAVENOUS at 05:12

## 2018-02-22 RX ADMIN — BUDESONIDE AND FORMOTEROL FUMARATE DIHYDRATE 2 PUFF: 80; 4.5 AEROSOL RESPIRATORY (INHALATION) at 20:04

## 2018-02-22 RX ADMIN — MAGNESIUM GLUCONATE 500 MG ORAL TABLET 400 MG: 500 TABLET ORAL at 20:03

## 2018-02-22 RX ADMIN — MAGNESIUM GLUCONATE 500 MG ORAL TABLET 400 MG: 500 TABLET ORAL at 08:52

## 2018-02-22 RX ADMIN — CARVEDILOL 3.12 MG: 3.12 TABLET, FILM COATED ORAL at 16:44

## 2018-02-22 RX ADMIN — ENOXAPARIN SODIUM 40 MG: 100 INJECTION SUBCUTANEOUS at 17:52

## 2018-02-22 RX ADMIN — FUROSEMIDE 20 MG: 10 INJECTION, SOLUTION INTRAMUSCULAR; INTRAVENOUS at 16:44

## 2018-02-22 RX ADMIN — AMPICILLIN SODIUM AND SULBACTAM SODIUM 3 G: 2; 1 INJECTION, POWDER, FOR SOLUTION INTRAMUSCULAR; INTRAVENOUS at 23:04

## 2018-02-22 RX ADMIN — LEVOTHYROXINE SODIUM ANHYDROUS 100 MCG: 100 INJECTION, POWDER, LYOPHILIZED, FOR SOLUTION INTRAVENOUS at 08:51

## 2018-02-22 RX ADMIN — NICOTINE 14 MG: 14 PATCH, EXTENDED RELEASE TRANSDERMAL at 05:12

## 2018-02-22 RX ADMIN — FOLIC ACID 5 MG: 1 TABLET ORAL at 08:51

## 2018-02-22 RX ADMIN — STANDARDIZED SENNA CONCENTRATE AND DOCUSATE SODIUM 2 TABLET: 8.6; 5 TABLET, FILM COATED ORAL at 20:04

## 2018-02-22 RX ADMIN — POTASSIUM CHLORIDE 20 MEQ: 1500 TABLET, EXTENDED RELEASE ORAL at 20:03

## 2018-02-22 RX ADMIN — TIOTROPIUM BROMIDE 1 CAPSULE: 18 CAPSULE ORAL; RESPIRATORY (INHALATION) at 08:50

## 2018-02-22 RX ADMIN — OMEPRAZOLE 20 MG: 20 CAPSULE, DELAYED RELEASE ORAL at 16:44

## 2018-02-22 RX ADMIN — OMEPRAZOLE 20 MG: 20 CAPSULE, DELAYED RELEASE ORAL at 06:04

## 2018-02-22 RX ADMIN — TAMSULOSIN HYDROCHLORIDE 0.4 MG: 0.4 CAPSULE ORAL at 20:03

## 2018-02-22 RX ADMIN — FUROSEMIDE 20 MG: 10 INJECTION, SOLUTION INTRAMUSCULAR; INTRAVENOUS at 05:12

## 2018-02-22 RX ADMIN — STANDARDIZED SENNA CONCENTRATE AND DOCUSATE SODIUM 2 TABLET: 8.6; 5 TABLET, FILM COATED ORAL at 08:52

## 2018-02-22 RX ADMIN — AMPICILLIN SODIUM AND SULBACTAM SODIUM 3 G: 2; 1 INJECTION, POWDER, FOR SOLUTION INTRAMUSCULAR; INTRAVENOUS at 16:44

## 2018-02-22 RX ADMIN — ASPIRIN 81 MG: 81 TABLET, CHEWABLE ORAL at 08:52

## 2018-02-22 RX ADMIN — AMPICILLIN SODIUM AND SULBACTAM SODIUM 3 G: 2; 1 INJECTION, POWDER, FOR SOLUTION INTRAMUSCULAR; INTRAVENOUS at 11:21

## 2018-02-22 RX ADMIN — POTASSIUM CHLORIDE 20 MEQ: 1500 TABLET, EXTENDED RELEASE ORAL at 08:52

## 2018-02-22 RX ADMIN — CARVEDILOL 3.12 MG: 3.12 TABLET, FILM COATED ORAL at 08:52

## 2018-02-22 RX ADMIN — OXYCODONE HYDROCHLORIDE 5 MG: 5 TABLET ORAL at 09:48

## 2018-02-22 RX ADMIN — OXYCODONE HYDROCHLORIDE 5 MG: 5 TABLET ORAL at 20:08

## 2018-02-22 RX ADMIN — AZITHROMYCIN 250 MG: 250 TABLET, FILM COATED ORAL at 16:44

## 2018-02-22 ASSESSMENT — ENCOUNTER SYMPTOMS
SORE THROAT: 0
FEVER: 0
NAUSEA: 0
DIZZINESS: 0
VOMITING: 0
DIARRHEA: 0
WHEEZING: 0
CHILLS: 0
HEARTBURN: 0
COUGH: 0
SHORTNESS OF BREATH: 0
ABDOMINAL PAIN: 0
WEAKNESS: 1
BLURRED VISION: 0

## 2018-02-22 ASSESSMENT — PAIN SCALES - GENERAL
PAINLEVEL_OUTOF10: 0
PAINLEVEL_OUTOF10: 0
PAINLEVEL_OUTOF10: 5
PAINLEVEL_OUTOF10: 0
PAINLEVEL_OUTOF10: 0

## 2018-02-22 NOTE — PROGRESS NOTES
Report received, pt care assumed, tele box on. VSS, pt assessment complete. Pt aaox4, no signs of distress noted at this time. POC discussed with pt and verbalizes no questions. Pt denies chest pain or sob at this time. C/O left shoulder pain w/swelling, no prn medications ordered, notified Dr. Mittal . Pt denies any additional needs at this time. Bed in lowest position, bed alarm off, pt educated on fall risk and verbalized understanding, call light within reach, will continue to monitor.

## 2018-02-22 NOTE — CONSULTS
Cardiology Consult Note:    Yojana Frias  Date & Time note created:    2/22/2018   2:24 PM     Referring MD:  Dr. Mittal    Patient ID:   Name:             Phillip Ann     YOB: 1960  Age:                 57 y.o.  male   MRN:               [id number]                                                             Reason for Consult:      CHF with EF 20%     History of Present Illness:      Phillip Ann is a pleasant 58 yo man with PMHx of methamphetamine abuse (reportedly in remission), MI, mitral valve replacement s/p bioprosthetic valve replacement (2017 in [facility]), HTN and tobacco abuse who is admitted for sepsis due to pneumonia. Due to continued hypoxia, dyspnea and orthopnea, he underwent an echocardiogram that revealed LVEF 20%. This is a new diagnosis of CHF and cardiology is consulted for management.     He is a poor historian. Does not remember dates, medications, procedures or diagnoses related to his heart.     Does not recall ever being told he has heart failure or hearing these words about him before.  He reports heart failure symptoms (orthopnea, exertional dyspnea) even prior to mitral valve replacement in ~2017 at [facility]. Bioprosthetic valve with a few months of anticoagulation (thinks it is coumadin but not sure). Previous MI many years ago but not sure where, when or how he was treated. Continues to smoke but cut it down to 1/2 pack per day. Smoking for decades now. Reports that he avoids alcohol. Vague about methamphetamine use -- discussed with me that he smoked meth ~11 months ago.       Review of Systems:      Constitutional: Denies fevers, Denies weight changes  Eyes: Denies changes in vision, no eye pain  Ears/Nose/Throat/Mouth: Denies nasal congestion or sore throat   Cardiovascular: denies chest pain,  Palpitations. Significant orthopnea.   Respiratory: significant exertional and resting shortness of breath , some cough.  Gastrointestinal/Hepatic: Denies abdominal pain,  nausea, vomiting, diarrhea, constipation or GI bleeding   Genitourinary: Denies dysuria or frequency  Musculoskeletal/Rheum: Denies  joint pain and swelling, no edema  Skin: Denies rash  Neurological: Denies headache, confusion, memory loss or focal weakness/parasthesias  Psychiatric: denies mood disorder   Endocrine: Keila thyroid problems  Heme/Oncology/Lymph Nodes: Denies enlarged lymph nodes, denies brusing or known bleeding disorder  All other systems were reviewed and are negative (AMA/CMS criteria)                Past Medical History:   No past medical history on file.  Active Hospital Problems    Diagnosis   • Acute and chronic respiratory failure (acute-on-chronic) (CMS-[facility]) [J96.20]     Priority: High   • CHF, acute on chronic (CMS-HCC) [I50.9]     Priority: High   • CAP (community acquired pneumonia) [J18.9]     Priority: High   • Sepsis (CMS-HCC) [A41.9]     Priority: High   • Renal insufficiency [N28.9]     Priority: Medium   • Hypomagnesemia [E83.42]     Priority: Medium   • S/P MVR (mitral valve replacement) [Z95.2]     Priority: Medium   • Elevated troponin [R74.8]     Priority: Medium   • COPD (chronic obstructive pulmonary disease) (CMS-HCC) [J44.9]     Priority: Medium   • Hypothyroid [E03.9]     Priority: Medium   • Pleural effusion [J90]     Priority: Medium   • Transaminitis [R74.0]     Priority: Medium   • Tobacco abuse [Z72.0]     Priority: Low       Past Surgical History:  No past surgical history on file.    Hospital Medications:  Current Facility-Administered Medications   Medication Dose   • oxyCODONE immediate-release (ROXICODONE) tablet 5 mg  5 mg    Or   • oxyCODONE immediate release (ROXICODONE) tablet 10 mg  10 mg   • carvedilol (COREG) tablet 3.125 mg  3.125 mg   • potassium chloride SA (Kdur) tablet 20 mEq  20 mEq   • furosemide (LASIX) injection 20 mg  20 mg   • aspirin (ASA) chewable tab 81 mg  81 mg   • budesonide-formoterol (SYMBICORT) 80-4.5 MCG/ACT inhaler 2 Puff  2 Puff   •  tiotropium (SPIRIVA) 18 MCG inhalation capsule 1 Cap  1 Cap   • magnesium oxide (MAG-OX) tablet 400 mg  400 mg   • levothyroxine (SYNTHROID) injection 100 mcg  100 mcg   • calcium carbonate (TUMS) chewable tab 1,000 mg  1,000 mg   • senna-docusate (PERICOLACE or SENOKOT S) 8.6-50 MG per tablet 2 Tab  2 Tab    And   • polyethylene glycol/lytes (MIRALAX) PACKET 1 Packet  1 Packet    And   • magnesium hydroxide (MILK OF MAGNESIA) suspension 30 mL  30 mL    And   • bisacodyl (DULCOLAX) suppository 10 mg  10 mg   • Respiratory Care per Protocol     • enoxaparin (LOVENOX) inj 40 mg  40 mg   • acetaminophen (TYLENOL) tablet 650 mg  650 mg   • ampicillin/sulbactam (UNASYN) 3 g in  mL IVPB  3 g   • azithromycin (ZITHROMAX) tablet 250 mg  250 mg   • ondansetron (ZOFRAN) syringe/vial injection 4 mg  4 mg   • ondansetron (ZOFRAN ODT) dispertab 4 mg  4 mg   • nicotine (NICODERM) 14 MG/24HR 14 mg  14 mg    And   • nicotine polacrilex (NICORETTE) 2 MG piece 2 mg  2 mg   • folic acid (FOLVITE) tablet 5 mg  5 mg   • omeprazole (PRILOSEC) capsule 20 mg  20 mg   • tamsulosin (FLOMAX) capsule 0.4 mg  0.4 mg   • ipratropium-albuterol (DUONEB) nebulizer solution 3 mL  3 mL         Current Outpatient Medications:  Prescriptions Prior to Admission   Medication Sig Dispense Refill Last Dose   • gabapentin (NEURONTIN) 400 MG Cap Take 1,200 mg by mouth every bedtime.   2/18/2018 at 2200   • folic acid (FOLVITE) 1 MG Tab Take 5 mg by mouth every day.   2/18/2018 at 0830   • simvastatin (ZOCOR) 20 MG Tab Take 20 mg by mouth every bedtime.   2/18/2018 at 2200   • omeprazole (PRILOSEC) 20 MG delayed-release capsule Take 20 mg by mouth 2 times a day.   2/18/2018 at 2200   • terazosin (HYTRIN) 2 MG Cap Take 2 mg by mouth every bedtime.   2/18/2018 at 2200   • tamsulosin (FLOMAX) 0.4 MG capsule Take 0.4 mg by mouth every bedtime.   2/18/2018 at 2200   • HYDROcodone/acetaminophen (NORCO)  MG Tab Take 1-2 Tabs by mouth every 6 hours as  "needed for Moderate Pain.   2/18/2018 at 2200   • levothyroxine (SYNTHROID) 100 MCG Tab Take 100 mcg by mouth Every morning on an empty stomach. Take with 137 mcg = 237 mcg daily   2/18/2018 at 0800   • levothyroxine (SYNTHROID) 137 MCG Tab Take 137 mcg by mouth Every morning on an empty stomach. Take with 100 mcg = 237 mcg daily   2/18/2018 at 0800   • Fluticasone Furoate-Vilanterol (BREO ELLIPTA) 200-25 MCG/INH AEROSOL POWDER, BREATH ACTIVATED Inhale 1 Puff by mouth every day.   2/19/2018 at 0930   • Umeclidinium Bromide (INCRUSE ELLIPTA) 62.5 MCG/INH AEROSOL POWDER, BREATH ACTIVATED Inhale 1 Puff by mouth every day.   2/19/2018 at 0900       Medication Allergy:  Allergies   Allergen Reactions   • Hydromorphone Itching, Vomiting and Nausea       Family History:  No family history on file.    Social History:  Social History     Social History   • Marital status:      Spouse name: N/A   • Number of children: N/A   • Years of education: N/A     Occupational History   • Not on file.     Social History Main Topics   • Smoking status: Not on file   • Smokeless tobacco: Not on file   • Alcohol use Not on file   • Drug use: Unknown   • Sexual activity: Not on file     Other Topics Concern   • Not on file     Social History Narrative   • No narrative on file         Physical Exam:  Vitals/ General Appearance:   Weight/BMI: Body mass index is 23.31 kg/m².  Blood pressure 121/83, pulse (!) 106, temperature 36.4 °C (97.6 °F), resp. rate 18, height 1.702 m (5' 7\"), weight 67.5 kg (148 lb 13 oz), SpO2 92 %.  Vitals:    02/21/18 2328 02/22/18 0400 02/22/18 0800 02/22/18 1200   BP: 128/90 111/80 115/62 121/83   Pulse: (!) 108 (!) 108 (!) 102 (!) 106   Resp: 18 16 19 18   Temp: 36.4 °C (97.6 °F) 36.3 °C (97.3 °F) 36.6 °C (97.9 °F) 36.4 °C (97.6 °F)   SpO2: 96% 99% 95% 92%   Weight:       Height:         Oxygen Therapy:  Pulse Oximetry: 92 %, O2 (LPM): 2.5, O2 Delivery: Silicone Nasal Cannula    Constitutional:   Ill " appearing, thin man. No acute distress  HENMT:  Normocephalic, Atraumatic, Oropharynx moist mucous membranes, No oral exudates, Nose normal.  No thyromegaly.  Eyes:  EOMI, Conjunctiva normal, No discharge.  Neck:  Normal range of motion, No cervical tenderness,  no JVD.  Cardiovascular:  Normal heart rate, Normal rhythm, No murmurs, No rubs, No gallops.  Extremitites with intact distal pulses, no cyanosis, or edema.  Lungs: Crackles bilaterally up to lower 1/3 of chest wall, no wheezing or rhonchi.   Abdomen: Bowel sounds normal, Soft, No tenderness, No guarding, No rebound, No masses, No hepatosplenomegaly.  Skin: Warm, Dry, No erythema, No rash, no induration.  Neurologic: Alert & oriented x 3, No focal deficits noted, cranial nerves II through X are grossly intact.  Psychiatric: Affect normal, Judgment normal, Mood normal.      MDM (Data Review):     Records reviewed and summarized in current documentation    Lab Data Review:  Recent Results (from the past 24 hour(s))   BTYPE NATRIURETIC PEPTIDE    Collection Time: 02/22/18  2:57 AM   Result Value Ref Range    B Natriuretic Peptide 1509 (H) 0 - 100 pg/mL   COMP METABOLIC PANEL    Collection Time: 02/22/18  2:57 AM   Result Value Ref Range    Sodium 137 135 - 145 mmol/L    Potassium 3.9 3.6 - 5.5 mmol/L    Chloride 98 96 - 112 mmol/L    Co2 31 20 - 33 mmol/L    Anion Gap 8.0 0.0 - 11.9    Glucose 100 (H) 65 - 99 mg/dL    Bun 24 (H) 8 - 22 mg/dL    Creatinine 1.16 0.50 - 1.40 mg/dL    Calcium 8.9 8.5 - 10.5 mg/dL    AST(SGOT) 80 (H) 12 - 45 U/L    ALT(SGPT) 250 (H) 2 - 50 U/L    Alkaline Phosphatase 214 (H) 30 - 99 U/L    Total Bilirubin 0.8 0.1 - 1.5 mg/dL    Albumin 3.5 3.2 - 4.9 g/dL    Total Protein 6.7 6.0 - 8.2 g/dL    Globulin 3.2 1.9 - 3.5 g/dL    A-G Ratio 1.1 g/dL   CBC WITH DIFFERENTIAL    Collection Time: 02/22/18  2:57 AM   Result Value Ref Range    WBC 12.3 (H) 4.8 - 10.8 K/uL    RBC 4.58 (L) 4.70 - 6.10 M/uL    Hemoglobin 12.9 (L) 14.0 - 18.0 g/dL     Hematocrit 40.3 (L) 42.0 - 52.0 %    MCV 88.0 81.4 - 97.8 fL    MCH 28.2 27.0 - 33.0 pg    MCHC 32.0 (L) 33.7 - 35.3 g/dL    RDW 52.7 (H) 35.9 - 50.0 fL    Platelet Count 262 164 - 446 K/uL    MPV 10.3 9.0 - 12.9 fL    Neutrophils-Polys 71.60 44.00 - 72.00 %    Lymphocytes 11.30 (L) 22.00 - 41.00 %    Monocytes 7.90 0.00 - 13.40 %    Eosinophils 7.90 (H) 0.00 - 6.90 %    Basophils 0.80 0.00 - 1.80 %    Immature Granulocytes 0.50 0.00 - 0.90 %    Nucleated RBC 0.00 /100 WBC    Neutrophils (Absolute) 8.84 (H) 1.82 - 7.42 K/uL    Lymphs (Absolute) 1.39 1.00 - 4.80 K/uL    Monos (Absolute) 0.97 (H) 0.00 - 0.85 K/uL    Eos (Absolute) 0.97 (H) 0.00 - 0.51 K/uL    Baso (Absolute) 0.10 0.00 - 0.12 K/uL    Immature Granulocytes (abs) 0.06 0.00 - 0.11 K/uL    NRBC (Absolute) 0.00 K/uL   ESTIMATED GFR    Collection Time: 02/22/18  2:57 AM   Result Value Ref Range    GFR If African American >60 >60 mL/min/1.73 m 2    GFR If Non African American >60 >60 mL/min/1.73 m 2       Imaging/Procedures Review:    Chest Xray:  Reviewed    EKG:   As in HPI. Sinus tachycardia, poor R wave progression.      Telemetry   Non sustained 4 beats of wide complex tachycardia     ECHO:    CONCLUSIONS  No prior study is available for comparison.   Known mitral valve bioprosthesis - mean transvalvular gradient is 12    mmHg at a heart rate of 110 BPM. This increased gradient could be   related to tachycardia. However, a transesophageal echocardiogram   should be done to further evaluate if clinically indicated.   Preferentially, this should be done when heart rate is better   controlled.  Severely reduced left ventricular systolic function. Left ventricular   ejection fraction is visually estimated to be 20%.  Moderate tricuspid regurgitation.   Estimated right ventricular systolic pressure  is 60 mmHg.       MDM (Assessment and Plan):     Active Hospital Problems    Diagnosis   • Acute and chronic respiratory failure (acute-on-chronic) (CMS-HCC)  [J96.20]     Priority: High   • CHF, acute on chronic (CMS-HCC) [I50.9]     Priority: High   • CAP (community acquired pneumonia) [J18.9]     Priority: High   • Sepsis (CMS-HCC) [A41.9]     Priority: High   • Renal insufficiency [N28.9]     Priority: Medium   • Hypomagnesemia [E83.42]     Priority: Medium   • S/P MVR (mitral valve replacement) [Z95.2]     Priority: Medium   • Elevated troponin [R74.8]     Priority: Medium   • COPD (chronic obstructive pulmonary disease) (CMS-HCC) [J44.9]     Priority: Medium   • Hypothyroid [E03.9]     Priority: Medium   • Pleural effusion [J90]     Priority: Medium   • Transaminitis [R74.0]     Priority: Medium   • Tobacco abuse [Z72.0]     Priority: Low     58 yo man with reported prior MI, methamphetamine abuse (in remission), MVR s/p bioprosthetic valve admitted with new diagnosis of HFrEF with acute exacerbation in the setting of sepsis from likely pneumonia       1. Please obtain records from [facility] especially from his cardiologist along with his PCP.     2. We will order KATY tentatively planned for tomorrow. He will also benefit from a cardiac left and right heart catheterization (given both concern for ischemic CAD along with pulmonary hypertension) at the same time.  Pending cath lab availability. Keep NPO at midnight.     3. When blood pressure able to tolerate and sepsis resolves, consider increasing lasix to 40 mg daily (preferably in AM) along with starting ACE-I/ARB for new onset heart failure.     4. Ideally, would diurese further given CHF exacerbation with notable rales and orthopnea. However, pt with sepsis due to pneumonia as noted by primary team. Therefore, the primary team is to consider increasing lasix to 40 mg or higher once daily when sepsis resolves to improve cardiac filling.     5. Counseled against smoking and drug use, especially methamphetamines. Pending UDS.     6. Needs to be discharged with out patient cardiology and heart failure clinic follow  up.     We will continue to follow. Discussed with Dr. Benoit.

## 2018-02-22 NOTE — PROGRESS NOTES
Bedside report received. Patient A&O X 4, tele- ST,  4L NC,  Voids- urinal, Activity- x1 assist, Diet- cardiac/2g NA, 1200ml fluid restriction. Complains of pain 0/10. POC discussed with patient. Pt verbalized understanding. Call light and belongings with in reach.  Bed locked and in lowest position,  and fall precautions in place.

## 2018-02-22 NOTE — PROGRESS NOTES
Assumed care of pt. Bedside report received from day R.N. Pt denies chest pain or SOB. VSS.  in place. Pt resting comfortably. All needs met. Bed low and locked, call light in reach. Discussed POC.

## 2018-02-22 NOTE — CARE PLAN
Problem: Safety  Goal: Will remain free from falls  Outcome: PROGRESSING AS EXPECTED  Patient educated on patient safety and fall precautions. Patient verbalized and demonstrated understanding of education. Patient will use call light for assistance.    Problem: Infection  Goal: Will remain free from infection  Outcome: PROGRESSING AS EXPECTED   Implement standard precautions and perform hand washing before and after patient contact. RN will follow protocols and necessary steps to minimize the spread of infection. RN educated pt and and any visitors on proper hand hygiene. Pt on IV ABX

## 2018-02-22 NOTE — PROGRESS NOTES
[facility]    Date of Service: 2018    Chief Complaint  57 y.o. male admitted 2018 with Sepsis, Pneumonia, Respiratory failure, CHF exacerbation.    Interval Problem Update  PNA - feels much better  Resp failure - back to baseline O2 2 lpm  CHF - EF 20%, baseline?      Consultants/Specialty  None    Disposition  TBD        Review of Systems   Constitutional: Positive for malaise/fatigue. Negative for chills and fever.   HENT: Negative for hearing loss and sore throat.    Eyes: Negative for blurred vision.   Respiratory: Positive for shortness of breath. Negative for cough and wheezing.    Cardiovascular: Negative for chest pain and leg swelling.   Gastrointestinal: Negative for abdominal pain, diarrhea, heartburn, nausea and vomiting.   Genitourinary: Negative for dysuria.   Neurological: Positive for weakness. Negative for dizziness.      Physical Exam  Laboratory/Imaging   Hemodynamics  Temp (24hrs), Av.8 °C (98.3 °F), Min:36.3 °C (97.3 °F), Max:37.7 °C (99.8 °F)   Temperature: 37.3 °C (99.2 °F)  Pulse  Av.6  Min: 62  Max: 116   Blood Pressure: 128/98      Respiratory      Respiration: 19, Pulse Oximetry: 96 %        RUL Breath Sounds: Clear, RML Breath Sounds: Diminished, RLL Breath Sounds: Diminished, EZE Breath Sounds: Clear, LLL Breath Sounds: Diminished    Fluids    Intake/Output Summary (Last 24 hours) at 18 1600  Last data filed at 18 1407   Gross per 24 hour   Intake             1840 ml   Output             3175 ml   Net            -1335 ml       Nutrition  Orders Placed This Encounter   Procedures   • Diet Order     Standing Status:   Standing     Number of Occurrences:   1     Order Specific Question:   Diet:     Answer:   Cardiac [6]     Order Specific Question:   Diet:     Answer:   2 Gram Sodium [7]     Order Specific Question:   Consistency/Fluid modifications:     Answer:   1200 ml Fluid Restriction [8]     Physical Exam   Constitutional: He is  oriented to person, place, and time. He appears well-developed and well-nourished.   HENT:   Head: Normocephalic and atraumatic.   Eyes: Conjunctivae are normal. Pupils are equal, round, and reactive to light.   Neck: No tracheal deviation present. No thyromegaly present.   Cardiovascular: Normal rate and regular rhythm.    Pulmonary/Chest: Effort normal. He has decreased breath sounds in the right lower field and the left lower field. He has rales.   Abdominal: Soft. Bowel sounds are normal. He exhibits distension. There is no tenderness. There is no rebound and no guarding.   Musculoskeletal: He exhibits no edema.   Lymphadenopathy:     He has no cervical adenopathy.   Neurological: He is alert and oriented to person, place, and time.   Skin: Skin is warm and dry.   Nursing note and vitals reviewed.      Recent Labs      02/19/18   1514  02/20/18   0412  02/21/18   0202   WBC  13.0*  12.6*  12.6*   RBC  4.46*  4.19*  4.22*   HEMOGLOBIN  13.1*  12.1*  12.1*   HEMATOCRIT  40.6*  37.5*  36.8*   MCV  91.0  89.5  87.2   MCH  29.4  28.9  28.7   MCHC  32.3*  32.3*  32.9*   RDW  55.9*  54.2*  51.3*   PLATELETCT  269  149*  238   MPV  10.8  11.4  10.4     Recent Labs      02/19/18   1514  02/20/18   0840  02/21/18   0202   SODIUM  142  137  137   POTASSIUM  4.4  4.0  3.7   CHLORIDE  106  101  100   CO2  23  28  29   GLUCOSE  90  97  115*   BUN  14  18  23*   CREATININE  0.96  1.12  1.39   CALCIUM  8.7  8.6  8.7     Recent Labs      02/20/18   0514   APTT  33.0   INR  1.26*     Recent Labs      02/19/18   1514  02/21/18   0202   BNPBTYPENAT  2730*  1113*     Recent Labs      02/19/18   0412   TRIGLYCERIDE  62   HDL  27*   LDL  47          Assessment/Plan     * CAP (community acquired pneumonia)- (present on admission)   Assessment & Plan    IV Unasyn and Azithromycin          Acute and chronic respiratory failure (acute-on-chronic) (CMS-HCC)- (present on admission)   Assessment & Plan    Keep O2 sats above 90%        Sepsis  (CMS-HCC)- (present on admission)   Assessment & Plan    This is sepsis (without associated acute organ dysfunction).   Source is pneumonia          CHF, acute on chronic (CMS-HCC)- (present on admission)   Assessment & Plan    Acute on Chronic Systolic CHF  IV Lasix, start Coreg  Will consult Cardiology        S/P MVR (mitral valve replacement)- (present on admission)   Assessment & Plan    stable        Hypomagnesemia- (present on admission)   Assessment & Plan    Start oral Mg        Renal insufficiency   Assessment & Plan    Decrease IV Lasix  Follow bmp        Pleural effusion- (present on admission)   Assessment & Plan    IV Lasix        Hypothyroid- (present on admission)   Assessment & Plan    IV Synthroid        COPD (chronic obstructive pulmonary disease) (CMS-HCC)- (present on admission)   Assessment & Plan    Spiriva, Symbicort, RT protocol        Elevated troponin- (present on admission)   Assessment & Plan    ASA        Transaminitis- (present on admission)   Assessment & Plan    Likely secondary to hepatic congestion  Follow cmp        Tobacco abuse- (present on admission)   Assessment & Plan    Nicotine replacement             Quality-Core Measures   Reviewed items::  EKG reviewed, Radiology images reviewed, Labs reviewed and Medications reviewed  Villareal catheter::  Urinary Tract Retention or Urinary Tract Obstruction  DVT prophylaxis pharmacological::  Enoxaparin (Lovenox)  Ulcer Prophylaxis::  Yes  Antibiotics:  Treating active infection/contamination beyond 24 hours perioperative coverage

## 2018-02-22 NOTE — PROGRESS NOTES
Pt walked around the unit on 3L NC, feels tired, tolerated diet, x1 assist, no complaints of pain. Continuous on ST, continue to measure in/out.

## 2018-02-22 NOTE — PROGRESS NOTES
[facility]    Date of Service: 2018    Chief Complaint  57 y.o. male admitted 2018 with Sepsis, Pneumonia, Respiratory failure, CHF exacerbation.    Interval Problem Update  PNA - feels much better  Resp failure - back to baseline O2 2 lpm  CHF - EF 20%, baseline?  LFTs - trending down    Consultants/Specialty  Consult Cardiology    Disposition  TBD        Review of Systems   Constitutional: Positive for malaise/fatigue. Negative for chills and fever.   HENT: Negative for hearing loss and sore throat.    Eyes: Negative for blurred vision.   Respiratory: Negative for cough, shortness of breath and wheezing.    Cardiovascular: Negative for chest pain and leg swelling.   Gastrointestinal: Negative for abdominal pain, diarrhea, heartburn, nausea and vomiting.   Genitourinary: Negative for dysuria.   Neurological: Positive for weakness. Negative for dizziness.      Physical Exam  Laboratory/Imaging   Hemodynamics  Temp (24hrs), Av.6 °C (97.9 °F), Min:36.3 °C (97.3 °F), Max:37 °C (98.6 °F)   Temperature: 36.4 °C (97.6 °F)  Pulse  Av  Min: 62  Max: 116   Blood Pressure: 121/83      Respiratory      Respiration: 18, Pulse Oximetry: 92 %        RUL Breath Sounds: Clear, RML Breath Sounds: Diminished, RLL Breath Sounds: Diminished, EZE Breath Sounds: Clear, LLL Breath Sounds: Diminished    Fluids    Intake/Output Summary (Last 24 hours) at 18 1442  Last data filed at 18 1300   Gross per 24 hour   Intake             2230 ml   Output             2275 ml   Net              -45 ml       Nutrition  Orders Placed This Encounter   Procedures   • Diet Order     Standing Status:   Standing     Number of Occurrences:   1     Order Specific Question:   Diet:     Answer:   Cardiac [6]     Order Specific Question:   Diet:     Answer:   2 Gram Sodium [7]     Order Specific Question:   Consistency/Fluid modifications:     Answer:   1200 ml Fluid Restriction [8]   • DIET NPO     Standing  Status:   Standing     Number of Occurrences:   8     Order Specific Question:   Restrict to:     Answer:   Sips with Medications [3]     Comments:   For tentative KATY tomorrow   • Diet NPO after Midnight     Standing Status:   Standing     Number of Occurrences:   8     Order Specific Question:   Restrict to:     Answer:   Sips with Medications [3]     Physical Exam   Constitutional: He is oriented to person, place, and time. He appears well-developed and well-nourished.   HENT:   Head: Normocephalic and atraumatic.   Eyes: Conjunctivae are normal. Pupils are equal, round, and reactive to light.   Neck: No tracheal deviation present. No thyromegaly present.   Cardiovascular: Normal rate and regular rhythm.    Pulmonary/Chest: Effort normal. He has decreased breath sounds in the right lower field and the left lower field. He has rales.   Abdominal: Soft. Bowel sounds are normal. He exhibits distension. There is no tenderness. There is no rebound and no guarding.   Musculoskeletal: He exhibits no edema.   Lymphadenopathy:     He has no cervical adenopathy.   Neurological: He is alert and oriented to person, place, and time.   Skin: Skin is warm and dry.   Nursing note and vitals reviewed.      Recent Labs      02/20/18   0412  02/21/18   0202  02/22/18   0257   WBC  12.6*  12.6*  12.3*   RBC  4.19*  4.22*  4.58*   HEMOGLOBIN  12.1*  12.1*  12.9*   HEMATOCRIT  37.5*  36.8*  40.3*   MCV  89.5  87.2  88.0   MCH  28.9  28.7  28.2   MCHC  32.3*  32.9*  32.0*   RDW  54.2*  51.3*  52.7*   PLATELETCT  149*  238  262   MPV  11.4  10.4  10.3     Recent Labs      02/20/18   0840  02/21/18   0202  02/22/18   0257   SODIUM  137  137  137   POTASSIUM  4.0  3.7  3.9   CHLORIDE  101  100  98   CO2  28  29  31   GLUCOSE  97  115*  100*   BUN  18  23*  24*   CREATININE  1.12  1.39  1.16   CALCIUM  8.6  8.7  8.9     Recent Labs      02/20/18   0514   APTT  33.0   INR  1.26*     Recent Labs      02/19/18   1514  02/21/18   0202   02/22/18   0257   BNPBTYPENAT  2730*  1113*  1509*              Assessment/Plan     * CAP (community acquired pneumonia)- (present on admission)   Assessment & Plan    IV Unasyn and Azithromycin          Acute and chronic respiratory failure (acute-on-chronic) (CMS-HCC)- (present on admission)   Assessment & Plan    Keep O2 sats above 90%        Sepsis (CMS-HCC)- (present on admission)   Assessment & Plan    This is sepsis (without associated acute organ dysfunction).   Source is pneumonia          CHF, acute on chronic (CMS-HCC)- (present on admission)   Assessment & Plan    Acute on Chronic Systolic CHF  IV Lasix, Coreg  consult Cardiology        S/P MVR (mitral valve replacement)- (present on admission)   Assessment & Plan    stable        Hypomagnesemia- (present on admission)   Assessment & Plan    oral Mg        Renal insufficiency   Assessment & Plan    IV Lasix  Follow bmp        Pleural effusion- (present on admission)   Assessment & Plan    IV Lasix        Hypothyroid- (present on admission)   Assessment & Plan    IV Synthroid, check TSH        COPD (chronic obstructive pulmonary disease) (CMS-HCC)- (present on admission)   Assessment & Plan    Spiriva, Symbicort, RT protocol        Elevated troponin- (present on admission)   Assessment & Plan    ASA        Transaminitis- (present on admission)   Assessment & Plan    Likely secondary to hepatic congestion  Follow cmp        Tobacco abuse- (present on admission)   Assessment & Plan    Nicotine replacement             Quality-Core Measures   Reviewed items::  EKG reviewed, Radiology images reviewed, Labs reviewed and Medications reviewed  Villareal catheter::  Urinary Tract Retention or Urinary Tract Obstruction  DVT prophylaxis pharmacological::  Enoxaparin (Lovenox)  Ulcer Prophylaxis::  Yes  Antibiotics:  Treating active infection/contamination beyond 24 hours perioperative coverage

## 2018-02-22 NOTE — PROGRESS NOTES
Pt updated on POC, pt verbalizes understanding, no questions. Pt resting comfortably in bed, no changes noted in status, denies any additional needs, call light within reach, will continue to monitor.

## 2018-02-22 NOTE — CARE PLAN
Problem: Communication  Goal: The ability to communicate needs accurately and effectively will improve  Outcome: PROGRESSING AS EXPECTED  Discussed Plan of Care, daily medications with patient, reviewed diet, and activity.  Patient verbalized understanding    Problem: Safety  Goal: Will remain free from injury  Outcome: PROGRESSING AS EXPECTED  Bed locked and in lowest position. Treaded socks. Call light and belongings with reach. Patient educated to call for assistance. Pt verbalized understanding. Hourly rounding in place.

## 2018-02-23 LAB
ALBUMIN SERPL BCP-MCNC: 3.4 G/DL (ref 3.2–4.9)
ALBUMIN/GLOB SERPL: 1.1 G/DL
ALP SERPL-CCNC: 162 U/L (ref 30–99)
ALT SERPL-CCNC: 172 U/L (ref 2–50)
ANION GAP SERPL CALC-SCNC: 6 MMOL/L (ref 0–11.9)
AST SERPL-CCNC: 44 U/L (ref 12–45)
BASOPHILS # BLD AUTO: 1 % (ref 0–1.8)
BASOPHILS # BLD: 0.1 K/UL (ref 0–0.12)
BILIRUB SERPL-MCNC: 0.6 MG/DL (ref 0.1–1.5)
BNP SERPL-MCNC: 1492 PG/ML (ref 0–100)
BUN SERPL-MCNC: 27 MG/DL (ref 8–22)
CALCIUM SERPL-MCNC: 8.2 MG/DL (ref 8.5–10.5)
CHLORIDE SERPL-SCNC: 101 MMOL/L (ref 96–112)
CO2 SERPL-SCNC: 31 MMOL/L (ref 20–33)
CREAT SERPL-MCNC: 1.09 MG/DL (ref 0.5–1.4)
EOSINOPHIL # BLD AUTO: 1.35 K/UL (ref 0–0.51)
EOSINOPHIL NFR BLD: 12.9 % (ref 0–6.9)
ERYTHROCYTE [DISTWIDTH] IN BLOOD BY AUTOMATED COUNT: 53.5 FL (ref 35.9–50)
GLOBULIN SER CALC-MCNC: 3 G/DL (ref 1.9–3.5)
GLUCOSE SERPL-MCNC: 98 MG/DL (ref 65–99)
HCT VFR BLD AUTO: 39.8 % (ref 42–52)
HGB BLD-MCNC: 12.6 G/DL (ref 14–18)
IMM GRANULOCYTES # BLD AUTO: 0.08 K/UL (ref 0–0.11)
IMM GRANULOCYTES NFR BLD AUTO: 0.8 % (ref 0–0.9)
LYMPHOCYTES # BLD AUTO: 1.5 K/UL (ref 1–4.8)
LYMPHOCYTES NFR BLD: 14.4 % (ref 22–41)
MCH RBC QN AUTO: 28 PG (ref 27–33)
MCHC RBC AUTO-ENTMCNC: 31.7 G/DL (ref 33.7–35.3)
MCV RBC AUTO: 88.4 FL (ref 81.4–97.8)
MONOCYTES # BLD AUTO: 0.97 K/UL (ref 0–0.85)
MONOCYTES NFR BLD AUTO: 9.3 % (ref 0–13.4)
NEUTROPHILS # BLD AUTO: 6.43 K/UL (ref 1.82–7.42)
NEUTROPHILS NFR BLD: 61.6 % (ref 44–72)
NRBC # BLD AUTO: 0 K/UL
NRBC BLD-RTO: 0 /100 WBC
PLATELET # BLD AUTO: 274 K/UL (ref 164–446)
PMV BLD AUTO: 10.6 FL (ref 9–12.9)
POTASSIUM SERPL-SCNC: 4.2 MMOL/L (ref 3.6–5.5)
PROT SERPL-MCNC: 6.4 G/DL (ref 6–8.2)
RBC # BLD AUTO: 4.5 M/UL (ref 4.7–6.1)
SODIUM SERPL-SCNC: 138 MMOL/L (ref 135–145)
TSH SERPL DL<=0.005 MIU/L-ACNC: 92.91 UIU/ML (ref 0.38–5.33)
WBC # BLD AUTO: 10.4 K/UL (ref 4.8–10.8)

## 2018-02-23 PROCEDURE — 99233 SBSQ HOSP IP/OBS HIGH 50: CPT | Performed by: FAMILY MEDICINE

## 2018-02-23 PROCEDURE — A9270 NON-COVERED ITEM OR SERVICE: HCPCS | Performed by: HOSPITALIST

## 2018-02-23 PROCEDURE — 36415 COLL VENOUS BLD VENIPUNCTURE: CPT

## 2018-02-23 PROCEDURE — 700102 HCHG RX REV CODE 250 W/ 637 OVERRIDE(OP): Performed by: INTERNAL MEDICINE

## 2018-02-23 PROCEDURE — C1769 GUIDE WIRE: HCPCS

## 2018-02-23 PROCEDURE — 99153 MOD SED SAME PHYS/QHP EA: CPT

## 2018-02-23 PROCEDURE — 700105 HCHG RX REV CODE 258: Performed by: INTERNAL MEDICINE

## 2018-02-23 PROCEDURE — 700101 HCHG RX REV CODE 250

## 2018-02-23 PROCEDURE — 700117 HCHG RX CONTRAST REV CODE 255: Performed by: INTERNAL MEDICINE

## 2018-02-23 PROCEDURE — 700111 HCHG RX REV CODE 636 W/ 250 OVERRIDE (IP)

## 2018-02-23 PROCEDURE — 770020 HCHG ROOM/CARE - TELE (206)

## 2018-02-23 PROCEDURE — 700102 HCHG RX REV CODE 250 W/ 637 OVERRIDE(OP): Performed by: FAMILY MEDICINE

## 2018-02-23 PROCEDURE — B2151ZZ FLUOROSCOPY OF LEFT HEART USING LOW OSMOLAR CONTRAST: ICD-10-PCS | Performed by: INTERNAL MEDICINE

## 2018-02-23 PROCEDURE — G0269 OCCLUSIVE DEVICE IN VEIN ART: HCPCS

## 2018-02-23 PROCEDURE — 307093 HCHG TR BAND RADIAL

## 2018-02-23 PROCEDURE — 83880 ASSAY OF NATRIURETIC PEPTIDE: CPT

## 2018-02-23 PROCEDURE — A9270 NON-COVERED ITEM OR SERVICE: HCPCS | Performed by: INTERNAL MEDICINE

## 2018-02-23 PROCEDURE — 700101 HCHG RX REV CODE 250: Performed by: FAMILY MEDICINE

## 2018-02-23 PROCEDURE — 85025 COMPLETE CBC W/AUTO DIFF WBC: CPT

## 2018-02-23 PROCEDURE — 93460 R&L HRT ART/VENTRICLE ANGIO: CPT

## 2018-02-23 PROCEDURE — 84443 ASSAY THYROID STIM HORMONE: CPT

## 2018-02-23 PROCEDURE — 700111 HCHG RX REV CODE 636 W/ 250 OVERRIDE (IP): Performed by: FAMILY MEDICINE

## 2018-02-23 PROCEDURE — C1894 INTRO/SHEATH, NON-LASER: HCPCS

## 2018-02-23 PROCEDURE — 4A1239Z MONITORING OF CARDIAC OUTPUT, PERCUTANEOUS APPROACH: ICD-10-PCS | Performed by: INTERNAL MEDICINE

## 2018-02-23 PROCEDURE — 80053 COMPREHEN METABOLIC PANEL: CPT

## 2018-02-23 PROCEDURE — 99152 MOD SED SAME PHYS/QHP 5/>YRS: CPT

## 2018-02-23 PROCEDURE — 4A133B3 MONITORING OF ARTERIAL PRESSURE, PULMONARY, PERCUTANEOUS APPROACH: ICD-10-PCS | Performed by: INTERNAL MEDICINE

## 2018-02-23 PROCEDURE — 700102 HCHG RX REV CODE 250 W/ 637 OVERRIDE(OP): Performed by: HOSPITALIST

## 2018-02-23 PROCEDURE — 304952 HCHG R 2 PADS

## 2018-02-23 PROCEDURE — 305478 HCHG 7.5FR EDWARDS SWAN/THERMO

## 2018-02-23 PROCEDURE — C1760 CLOSURE DEV, VASC: HCPCS

## 2018-02-23 PROCEDURE — 360979 HCHG DIAGNOSTIC CATH

## 2018-02-23 PROCEDURE — B2111ZZ FLUOROSCOPY OF MULTIPLE CORONARY ARTERIES USING LOW OSMOLAR CONTRAST: ICD-10-PCS | Performed by: INTERNAL MEDICINE

## 2018-02-23 PROCEDURE — 4A023N8 MEASUREMENT OF CARDIAC SAMPLING AND PRESSURE, BILATERAL, PERCUTANEOUS APPROACH: ICD-10-PCS | Performed by: INTERNAL MEDICINE

## 2018-02-23 PROCEDURE — A9270 NON-COVERED ITEM OR SERVICE: HCPCS | Performed by: FAMILY MEDICINE

## 2018-02-23 PROCEDURE — 700111 HCHG RX REV CODE 636 W/ 250 OVERRIDE (IP): Performed by: INTERNAL MEDICINE

## 2018-02-23 RX ORDER — LIDOCAINE HYDROCHLORIDE 20 MG/ML
INJECTION, SOLUTION INFILTRATION; PERINEURAL
Status: COMPLETED
Start: 2018-02-23 | End: 2018-02-23

## 2018-02-23 RX ORDER — HALOPERIDOL 5 MG/ML
1 INJECTION INTRAMUSCULAR EVERY 6 HOURS PRN
Status: DISCONTINUED | OUTPATIENT
Start: 2018-02-23 | End: 2018-02-26 | Stop reason: HOSPADM

## 2018-02-23 RX ORDER — ATORVASTATIN CALCIUM 80 MG/1
80 TABLET, FILM COATED ORAL
Status: DISCONTINUED | OUTPATIENT
Start: 2018-02-23 | End: 2018-02-26 | Stop reason: HOSPADM

## 2018-02-23 RX ORDER — MIDAZOLAM HYDROCHLORIDE 1 MG/ML
INJECTION INTRAMUSCULAR; INTRAVENOUS
Status: COMPLETED
Start: 2018-02-23 | End: 2018-02-23

## 2018-02-23 RX ORDER — HEPARIN SODIUM,PORCINE 1000/ML
VIAL (ML) INJECTION
Status: COMPLETED
Start: 2018-02-23 | End: 2018-02-23

## 2018-02-23 RX ORDER — ACETAMINOPHEN 325 MG/1
650 TABLET ORAL EVERY 6 HOURS PRN
Status: DISCONTINUED | OUTPATIENT
Start: 2018-02-23 | End: 2018-02-26 | Stop reason: HOSPADM

## 2018-02-23 RX ORDER — DEXAMETHASONE SODIUM PHOSPHATE 4 MG/ML
4 INJECTION, SOLUTION INTRA-ARTICULAR; INTRALESIONAL; INTRAMUSCULAR; INTRAVENOUS; SOFT TISSUE
Status: DISCONTINUED | OUTPATIENT
Start: 2018-02-23 | End: 2018-02-26 | Stop reason: HOSPADM

## 2018-02-23 RX ORDER — SCOLOPAMINE TRANSDERMAL SYSTEM 1 MG/1
1 PATCH, EXTENDED RELEASE TRANSDERMAL
Status: DISCONTINUED | OUTPATIENT
Start: 2018-02-23 | End: 2018-02-26 | Stop reason: HOSPADM

## 2018-02-23 RX ORDER — DIPHENHYDRAMINE HYDROCHLORIDE 50 MG/ML
25 INJECTION INTRAMUSCULAR; INTRAVENOUS EVERY 6 HOURS PRN
Status: DISCONTINUED | OUTPATIENT
Start: 2018-02-23 | End: 2018-02-26 | Stop reason: HOSPADM

## 2018-02-23 RX ORDER — SODIUM CHLORIDE 9 MG/ML
INJECTION, SOLUTION INTRAVENOUS CONTINUOUS
Status: DISCONTINUED | OUTPATIENT
Start: 2018-02-23 | End: 2018-02-23

## 2018-02-23 RX ORDER — ONDANSETRON 2 MG/ML
4 INJECTION INTRAMUSCULAR; INTRAVENOUS EVERY 4 HOURS PRN
Status: DISCONTINUED | OUTPATIENT
Start: 2018-02-23 | End: 2018-02-26 | Stop reason: HOSPADM

## 2018-02-23 RX ORDER — VERAPAMIL HYDROCHLORIDE 2.5 MG/ML
INJECTION, SOLUTION INTRAVENOUS
Status: COMPLETED
Start: 2018-02-23 | End: 2018-02-23

## 2018-02-23 RX ADMIN — AZITHROMYCIN 250 MG: 250 TABLET, FILM COATED ORAL at 18:14

## 2018-02-23 RX ADMIN — ATORVASTATIN CALCIUM 80 MG: 80 TABLET, FILM COATED ORAL at 20:47

## 2018-02-23 RX ADMIN — SODIUM CHLORIDE: 9 INJECTION, SOLUTION INTRAVENOUS at 12:15

## 2018-02-23 RX ADMIN — MAGNESIUM GLUCONATE 500 MG ORAL TABLET 400 MG: 500 TABLET ORAL at 08:18

## 2018-02-23 RX ADMIN — HEPARIN SODIUM 2000 UNITS: 200 INJECTION, SOLUTION INTRAVENOUS at 11:09

## 2018-02-23 RX ADMIN — BUDESONIDE AND FORMOTEROL FUMARATE DIHYDRATE 2 PUFF: 80; 4.5 AEROSOL RESPIRATORY (INHALATION) at 08:18

## 2018-02-23 RX ADMIN — AMPICILLIN SODIUM AND SULBACTAM SODIUM 3 G: 2; 1 INJECTION, POWDER, FOR SOLUTION INTRAMUSCULAR; INTRAVENOUS at 18:14

## 2018-02-23 RX ADMIN — FOLIC ACID 5 MG: 1 TABLET ORAL at 08:17

## 2018-02-23 RX ADMIN — CARVEDILOL 3.12 MG: 3.12 TABLET, FILM COATED ORAL at 18:14

## 2018-02-23 RX ADMIN — BUDESONIDE AND FORMOTEROL FUMARATE DIHYDRATE 2 PUFF: 80; 4.5 AEROSOL RESPIRATORY (INHALATION) at 20:51

## 2018-02-23 RX ADMIN — TAMSULOSIN HYDROCHLORIDE 0.4 MG: 0.4 CAPSULE ORAL at 20:47

## 2018-02-23 RX ADMIN — OMEPRAZOLE 20 MG: 20 CAPSULE, DELAYED RELEASE ORAL at 05:21

## 2018-02-23 RX ADMIN — IOHEXOL 71 ML: 350 INJECTION, SOLUTION INTRAVENOUS at 11:37

## 2018-02-23 RX ADMIN — NITROGLYCERIN 10 ML: 20 INJECTION INTRAVENOUS at 11:09

## 2018-02-23 RX ADMIN — POTASSIUM CHLORIDE 20 MEQ: 1500 TABLET, EXTENDED RELEASE ORAL at 08:18

## 2018-02-23 RX ADMIN — OXYCODONE HYDROCHLORIDE 5 MG: 5 TABLET ORAL at 18:14

## 2018-02-23 RX ADMIN — FUROSEMIDE 20 MG: 10 INJECTION, SOLUTION INTRAMUSCULAR; INTRAVENOUS at 05:19

## 2018-02-23 RX ADMIN — OXYCODONE HYDROCHLORIDE 5 MG: 5 TABLET ORAL at 02:05

## 2018-02-23 RX ADMIN — POTASSIUM CHLORIDE 20 MEQ: 1500 TABLET, EXTENDED RELEASE ORAL at 20:47

## 2018-02-23 RX ADMIN — MAGNESIUM GLUCONATE 500 MG ORAL TABLET 400 MG: 500 TABLET ORAL at 20:46

## 2018-02-23 RX ADMIN — TIOTROPIUM BROMIDE 1 CAPSULE: 18 CAPSULE ORAL; RESPIRATORY (INHALATION) at 08:18

## 2018-02-23 RX ADMIN — AMPICILLIN SODIUM AND SULBACTAM SODIUM 3 G: 2; 1 INJECTION, POWDER, FOR SOLUTION INTRAMUSCULAR; INTRAVENOUS at 12:18

## 2018-02-23 RX ADMIN — AMPICILLIN SODIUM AND SULBACTAM SODIUM 3 G: 2; 1 INJECTION, POWDER, FOR SOLUTION INTRAMUSCULAR; INTRAVENOUS at 05:19

## 2018-02-23 RX ADMIN — ASPIRIN 81 MG: 81 TABLET, CHEWABLE ORAL at 08:17

## 2018-02-23 RX ADMIN — HEPARIN SODIUM: 1000 INJECTION, SOLUTION INTRAVENOUS; SUBCUTANEOUS at 11:09

## 2018-02-23 RX ADMIN — LEVOTHYROXINE SODIUM ANHYDROUS 100 MCG: 100 INJECTION, POWDER, LYOPHILIZED, FOR SOLUTION INTRAVENOUS at 08:23

## 2018-02-23 RX ADMIN — OXYCODONE HYDROCHLORIDE 5 MG: 5 TABLET ORAL at 08:23

## 2018-02-23 RX ADMIN — FUROSEMIDE 20 MG: 10 INJECTION, SOLUTION INTRAMUSCULAR; INTRAVENOUS at 16:06

## 2018-02-23 RX ADMIN — ACETAMINOPHEN 650 MG: 325 TABLET, FILM COATED ORAL at 20:47

## 2018-02-23 RX ADMIN — NICOTINE 14 MG: 14 PATCH, EXTENDED RELEASE TRANSDERMAL at 05:21

## 2018-02-23 RX ADMIN — LIDOCAINE HYDROCHLORIDE: 20 INJECTION, SOLUTION INFILTRATION; PERINEURAL at 11:09

## 2018-02-23 RX ADMIN — OMEPRAZOLE 20 MG: 20 CAPSULE, DELAYED RELEASE ORAL at 18:14

## 2018-02-23 RX ADMIN — VERAPAMIL HYDROCHLORIDE 5 MG: 2.5 INJECTION, SOLUTION INTRAVENOUS at 11:09

## 2018-02-23 RX ADMIN — CARVEDILOL 3.12 MG: 3.12 TABLET, FILM COATED ORAL at 08:18

## 2018-02-23 RX ADMIN — FENTANYL CITRATE 75 MCG: 50 INJECTION, SOLUTION INTRAMUSCULAR; INTRAVENOUS at 11:37

## 2018-02-23 RX ADMIN — MIDAZOLAM 2 MG: 1 INJECTION INTRAMUSCULAR; INTRAVENOUS at 11:18

## 2018-02-23 ASSESSMENT — ENCOUNTER SYMPTOMS
DIARRHEA: 0
VOMITING: 0
NAUSEA: 0
CHILLS: 0
WHEEZING: 0
DIZZINESS: 0
ABDOMINAL PAIN: 0
SORE THROAT: 0
HEARTBURN: 0
COUGH: 0
BLURRED VISION: 0
SHORTNESS OF BREATH: 0
WEAKNESS: 1
FEVER: 0

## 2018-02-23 ASSESSMENT — PAIN SCALES - GENERAL
PAINLEVEL_OUTOF10: 6
PAINLEVEL_OUTOF10: 5
PAINLEVEL_OUTOF10: 5
PAINLEVEL_OUTOF10: 0
PAINLEVEL_OUTOF10: 5
PAINLEVEL_OUTOF10: 4
PAINLEVEL_OUTOF10: 5

## 2018-02-23 NOTE — HEART FAILURE PROGRAM
"Cardiovascular Nurse Navigator () Progress Note:     Please note this is a HF pt. As of 2/22 IV diuresis is ongoing, Cardiology UNSOM consulted.      [facility] Plan Notes:   None  Therapy Notes:   None  Insurance Coverage:  Medical  Patient Residence:  [place], [place]  Follow up appointment:   Pt must have an appointment scheduled within 7 days of discharge (Cardiology, PCP, or DC Clinic).      This [facility] has placed an order for Hospital Schedulers to arrange f/u appointment within seven calendar days of anticipated discharge date: 2/24. Pt will have to be seen by a provider in [place] due to medical coverage.    Education:  Bedside nursing to please provide patient with HF packet and begin teaching and documenting in education tab, each shift should teach sections until all are covered.     When completing the after visit summary (discharge instructions) please select \"Cardiac Diagnosis, and Heart Failure\" in the special instructions section to populate the heart failure specific discharge instructions.     Referrals Placed:    Social Work   Please assess for any social barriers in managing this disease process which includes: going to frequent specialist appointments, taking medications daily, weighing daily, monitoring symptoms and contacting MD for changes daily, eating low sodium foods.     Registered Dietician  To provide education on HF diet.    Thank you and please call CARMEN Ramirez with any questions: [phone].   "

## 2018-02-23 NOTE — CATH LAB
Immediate Post-Operative Note      PreOp Diagnosis: CHF , s/p bio prosthetic MV replacement    PostOp Diagnosis: Severely reduced EF, 50% mid LAD, no sig MR    Procedure(s) :  Coronary Angiography, Right and Left Heart Catheterization, Left Ventriculography  Cardiac output  Angioseal    Surgeon(s):  Do Garces M.D.    Type of Anesthesia: Moderate Sedation    Specimen: None    Estimated Blood Loss: 10 cc's      Findings: As above PCWP mean 13, LVEDP 7    Complications: none      Do Garces M.D.  2/23/2018 11:49 AM

## 2018-02-23 NOTE — PROCEDURES
DATE OF SERVICE:  02/23/2018    PREOPERATIVE DIAGNOSES:  1.  Status post prosthetic mitral valve replacement with systolic heart   failure with severely reduced left ventricular systolic function, ejection   fraction 20%.  2.  History of methamphetamine abuse.    POSTOPERATIVE DIAGNOSES:  1.  Severely reduced left ventricular systolic function, ejection fraction 22%   with global hypokinesis, but no significant mitral regurgitation.  2.  A 50% stenosis in mid left anterior descending artery, but no flow   limiting disease.  3.  Pulmonary pressure of 25/19 with a mean of 20 mmHg and mean pulmonary   capillary wedge pressure of 13 mmHg.    PROCEDURE:  1.  Right and left heart catheterization with left ventriculography.  2.  Selective coronary angiography.  3.  Cardiac output determination by thermodilution technique.  4.  Angio-Seal to the right femoral artery.    COMPLICATIONS:  None.    DESCRIPTION OF PROCEDURE:  After informed consent was obtained, the patient   brought to cardiac catheterization laboratory in fasting state.  Jax test   was carried out on the right hand and was found to be negative.  Right wrist   and right groin prepped and draped in the usual sterile fashion.  We initially   attempted to cannulate the right radial artery was found to be somewhat   small.  We switched to the femoral approach.  After local anesthetic, using   portable ultrasound, 8-Iranian was placed in right common femoral vein.  A   6-Iranian sheath was placed in the right common femoral artery.    Next, a 7.5-Iranian [place] A catheter was advanced to the right heart and   subsequently into wedge position.  Pulmonary capillary wedge pressure was   recorded.    Balloon was deflated and cardiac output was determined using thermodilution   technique.    Next, a 6-Iranian angled pigtail catheter was then advanced to the left   ventricle.  Attempted to measure simultaneous left ventricular end diastolic   pressure and pulmonary  capillary wedge pressure was performed.  The patient,   however, was somewhat irritable with frequent PVCs making the measurement   difficult.    The left ventriculography was then performed using 30 mL of contrast injected   over 3 seconds.  Left heart pullback was then performed.  It was followed by   right heart pullback.  The [place]-Marco A was then removed.  Next, selective   angiography of the left coronary artery was performed using 6-Kyrgyz JL4   catheter was followed by selective angiography of right coronary artery using   6-Kyrgyz 3DRC catheter.    Next, angiography of right coronary artery was performed felt to be suitable   for closure device.  Femoral arterial sheaths were then removed.  Hemostasis   was obtained using Angio-Seal.  Venous sheath was subsequently removed.    Hemostasis was obtained using manual compression.  The patient tolerated the   procedure well and left the catheterization laboratory in stable condition.    FINDINGS:  I. HEMODYNAMICS:  Aortic pressure was 90/69 with a mean of 78 mmHg.  Mean   pulmonary capillary wedge pressure was 13 mmHg.  Pulmonary artery pressure was   25/19 with mean of 20.  RV systolic pressure was 29 with RV end diastolic   pressure of 5, mean right atrial pressure of 4 mmHg.  Cardiac output was 2.5 liter   per minute, cardiac index 1.37 liters per minute.  2.  Left ventriculography showed severe reduced left ventricular systolic   function with global hypokinesis to akinesis.  Ejection fraction was measured   to be at 22%.  Mitral valve ring was noted with no significant mitral   regurgitation.  3.  Nonobstructive coronary angiogram:    The left main is a large caliber vessel and angiographically free of disease.  It bifurcated into left anterior descending artery and circumflex artery.   Left anterior descending artery is a large caliber vessel in the range of   9 mm diameter.  It extend to the apical 1/2 of the inferior wall.  It gives   rise to multiple  medium size and 1 large diagonal branches.  The mid left   anterior descending has eccentric 50% stenosis.  The antegrade flow remained   normal.    Left circumflex artery is a large caliber vessel as well. It gave rise to 2   medium sized obtuse marginal branches, 1 large OM branch in the mid   and distal segment.  There is no significant disease noted in the left   circumflex artery or its major branches.    Right coronary dominant system.  Right coronary artery is a large vessel.    Gives rise to one medium size acute marginal branch, medium size posterior   descending artery and posterolateral branch.  No significant disease in the   right coronary or its major branches.    PLAN:  Bed rest for 4-6 hours.  Optimize medical therapy for systolic heart   failure.       ____________________________________     MD JOSELINE LANDAVERDE / MILADYS    DD:  02/23/2018 12:27:17  DT:  02/23/2018 15:44:03    D#:  [id number]  Job#:  [id number]

## 2018-02-23 NOTE — PROGRESS NOTES
Pt arrived back to floor from cath. Rradial and Rgroin approach done. Pt on bedrest. VSS, and post op vitals started. Monitors aware.

## 2018-02-23 NOTE — CARE PLAN
Problem: Venous Thromboembolism (VTW)/Deep Vein Thrombosis (DVT) Prevention:  Goal: Patient will participate in Venous Thrombosis (VTE)/Deep Vein Thrombosis (DVT)Prevention Measures   02/22/18 2000   OTHER   Risk Assessment Score 2   VTE RISK Moderate   Pharmacologic Prophylaxis Used LMWH: Enoxaparin(Lovenox)       Problem: Pain Management  Goal: Pain level will decrease to patient's comfort goal  Outcome: PROGRESSING AS EXPECTED  Pt encouraged to notified staff if in pain. Pain controlled with non-pharmacologic and pharmacologic methods. Pain under control overnight.

## 2018-02-23 NOTE — PROGRESS NOTES
Received report and assumed care of patient. Patient is alert and oriented x4. Patient is in no signs of distress and denies pain at this time. Patient going to cath lab today. Patient has been NPO since 0000. Patient was updated on the plan of care for the day. Call light within reach, bed in low position, 2 side rails up. Educated on fall risk, verbalizes understanding. Will continue to monitor

## 2018-02-23 NOTE — PROGRESS NOTES
Report received. Pt A&Ox4, in no signs of distress. LDAs functioning properly. Discussed POC with pt. Denies pain at this time. Call light within reach. Fall precautions in place. Denies any additional needs at this time.

## 2018-02-23 NOTE — PROGRESS NOTES
No changes noted in status. Needs addressed. Patient off floor via w/c with transportation for CXR. No distress noted.

## 2018-02-24 PROBLEM — I25.10 CAD (CORONARY ARTERY DISEASE): Status: ACTIVE | Noted: 2018-02-24

## 2018-02-24 LAB
ALBUMIN SERPL BCP-MCNC: 3.4 G/DL (ref 3.2–4.9)
ALBUMIN/GLOB SERPL: 1.2 G/DL
ALP SERPL-CCNC: 175 U/L (ref 30–99)
ALT SERPL-CCNC: 132 U/L (ref 2–50)
ANION GAP SERPL CALC-SCNC: 8 MMOL/L (ref 0–11.9)
AST SERPL-CCNC: 34 U/L (ref 12–45)
BACTERIA BLD CULT: NORMAL
BASOPHILS # BLD AUTO: 1 % (ref 0–1.8)
BASOPHILS # BLD: 0.11 K/UL (ref 0–0.12)
BILIRUB SERPL-MCNC: 0.6 MG/DL (ref 0.1–1.5)
BNP SERPL-MCNC: 847 PG/ML (ref 0–100)
BUN SERPL-MCNC: 30 MG/DL (ref 8–22)
CALCIUM SERPL-MCNC: 8.7 MG/DL (ref 8.5–10.5)
CHLORIDE SERPL-SCNC: 97 MMOL/L (ref 96–112)
CO2 SERPL-SCNC: 29 MMOL/L (ref 20–33)
CREAT SERPL-MCNC: 1.41 MG/DL (ref 0.5–1.4)
EOSINOPHIL # BLD AUTO: 1.1 K/UL (ref 0–0.51)
EOSINOPHIL NFR BLD: 10.2 % (ref 0–6.9)
ERYTHROCYTE [DISTWIDTH] IN BLOOD BY AUTOMATED COUNT: 54.6 FL (ref 35.9–50)
GLOBULIN SER CALC-MCNC: 2.8 G/DL (ref 1.9–3.5)
GLUCOSE SERPL-MCNC: 98 MG/DL (ref 65–99)
HCT VFR BLD AUTO: 39.6 % (ref 42–52)
HGB BLD-MCNC: 12.5 G/DL (ref 14–18)
IMM GRANULOCYTES # BLD AUTO: 0.07 K/UL (ref 0–0.11)
IMM GRANULOCYTES NFR BLD AUTO: 0.7 % (ref 0–0.9)
LV EJECT FRACT  99904: 20
LYMPHOCYTES # BLD AUTO: 1.43 K/UL (ref 1–4.8)
LYMPHOCYTES NFR BLD: 13.3 % (ref 22–41)
MCH RBC QN AUTO: 28.3 PG (ref 27–33)
MCHC RBC AUTO-ENTMCNC: 31.6 G/DL (ref 33.7–35.3)
MCV RBC AUTO: 89.6 FL (ref 81.4–97.8)
MONOCYTES # BLD AUTO: 1.06 K/UL (ref 0–0.85)
MONOCYTES NFR BLD AUTO: 9.9 % (ref 0–13.4)
NEUTROPHILS # BLD AUTO: 6.99 K/UL (ref 1.82–7.42)
NEUTROPHILS NFR BLD: 64.9 % (ref 44–72)
NRBC # BLD AUTO: 0 K/UL
NRBC BLD-RTO: 0 /100 WBC
PLATELET # BLD AUTO: 276 K/UL (ref 164–446)
PMV BLD AUTO: 10.1 FL (ref 9–12.9)
POTASSIUM SERPL-SCNC: 4.3 MMOL/L (ref 3.6–5.5)
PROT SERPL-MCNC: 6.2 G/DL (ref 6–8.2)
RBC # BLD AUTO: 4.42 M/UL (ref 4.7–6.1)
SIGNIFICANT IND 70042: NORMAL
SITE SITE: NORMAL
SODIUM SERPL-SCNC: 134 MMOL/L (ref 135–145)
SOURCE SOURCE: NORMAL
WBC # BLD AUTO: 10.8 K/UL (ref 4.8–10.8)

## 2018-02-24 PROCEDURE — 36415 COLL VENOUS BLD VENIPUNCTURE: CPT

## 2018-02-24 PROCEDURE — 700111 HCHG RX REV CODE 636 W/ 250 OVERRIDE (IP): Performed by: NURSE PRACTITIONER

## 2018-02-24 PROCEDURE — 770020 HCHG ROOM/CARE - TELE (206)

## 2018-02-24 PROCEDURE — 700102 HCHG RX REV CODE 250 W/ 637 OVERRIDE(OP): Performed by: FAMILY MEDICINE

## 2018-02-24 PROCEDURE — A9270 NON-COVERED ITEM OR SERVICE: HCPCS | Performed by: STUDENT IN AN ORGANIZED HEALTH CARE EDUCATION/TRAINING PROGRAM

## 2018-02-24 PROCEDURE — A9270 NON-COVERED ITEM OR SERVICE: HCPCS | Performed by: HOSPITALIST

## 2018-02-24 PROCEDURE — 80053 COMPREHEN METABOLIC PANEL: CPT

## 2018-02-24 PROCEDURE — 83880 ASSAY OF NATRIURETIC PEPTIDE: CPT

## 2018-02-24 PROCEDURE — 93312 ECHO TRANSESOPHAGEAL: CPT

## 2018-02-24 PROCEDURE — 700102 HCHG RX REV CODE 250 W/ 637 OVERRIDE(OP): Performed by: STUDENT IN AN ORGANIZED HEALTH CARE EDUCATION/TRAINING PROGRAM

## 2018-02-24 PROCEDURE — 700105 HCHG RX REV CODE 258: Performed by: INTERNAL MEDICINE

## 2018-02-24 PROCEDURE — 700111 HCHG RX REV CODE 636 W/ 250 OVERRIDE (IP): Performed by: INTERNAL MEDICINE

## 2018-02-24 PROCEDURE — 93325 DOPPLER ECHO COLOR FLOW MAPG: CPT

## 2018-02-24 PROCEDURE — 700101 HCHG RX REV CODE 250: Performed by: FAMILY MEDICINE

## 2018-02-24 PROCEDURE — 85025 COMPLETE CBC W/AUTO DIFF WBC: CPT

## 2018-02-24 PROCEDURE — 99233 SBSQ HOSP IP/OBS HIGH 50: CPT | Performed by: FAMILY MEDICINE

## 2018-02-24 PROCEDURE — A9270 NON-COVERED ITEM OR SERVICE: HCPCS | Performed by: FAMILY MEDICINE

## 2018-02-24 PROCEDURE — 700102 HCHG RX REV CODE 250 W/ 637 OVERRIDE(OP): Performed by: HOSPITALIST

## 2018-02-24 PROCEDURE — 700111 HCHG RX REV CODE 636 W/ 250 OVERRIDE (IP): Performed by: FAMILY MEDICINE

## 2018-02-24 PROCEDURE — 93321 DOPPLER ECHO F-UP/LMTD STD: CPT

## 2018-02-24 PROCEDURE — A9270 NON-COVERED ITEM OR SERVICE: HCPCS | Performed by: INTERNAL MEDICINE

## 2018-02-24 PROCEDURE — 700102 HCHG RX REV CODE 250 W/ 637 OVERRIDE(OP): Performed by: INTERNAL MEDICINE

## 2018-02-24 RX ORDER — SPIRONOLACTONE 25 MG/1
25 TABLET ORAL
Status: DISCONTINUED | OUTPATIENT
Start: 2018-02-25 | End: 2018-02-25

## 2018-02-24 RX ORDER — SPIRONOLACTONE 25 MG/1
12.5 TABLET ORAL
Status: DISCONTINUED | OUTPATIENT
Start: 2018-02-24 | End: 2018-02-24

## 2018-02-24 RX ORDER — FUROSEMIDE 10 MG/ML
20 INJECTION INTRAMUSCULAR; INTRAVENOUS
Status: DISCONTINUED | OUTPATIENT
Start: 2018-02-25 | End: 2018-02-24

## 2018-02-24 RX ORDER — FUROSEMIDE 20 MG/1
20 TABLET ORAL
Status: DISCONTINUED | OUTPATIENT
Start: 2018-02-24 | End: 2018-02-24

## 2018-02-24 RX ORDER — MIDAZOLAM HYDROCHLORIDE 1 MG/ML
5 INJECTION INTRAMUSCULAR; INTRAVENOUS ONCE
Status: COMPLETED | OUTPATIENT
Start: 2018-02-24 | End: 2018-02-24

## 2018-02-24 RX ORDER — LISINOPRIL 10 MG/1
10 TABLET ORAL
Status: DISCONTINUED | OUTPATIENT
Start: 2018-02-24 | End: 2018-02-25

## 2018-02-24 RX ORDER — CARVEDILOL 6.25 MG/1
6.25 TABLET ORAL 2 TIMES DAILY WITH MEALS
Status: DISCONTINUED | OUTPATIENT
Start: 2018-02-24 | End: 2018-02-26 | Stop reason: HOSPADM

## 2018-02-24 RX ADMIN — OMEPRAZOLE 20 MG: 20 CAPSULE, DELAYED RELEASE ORAL at 18:30

## 2018-02-24 RX ADMIN — CARVEDILOL 6.25 MG: 6.25 TABLET, FILM COATED ORAL at 18:30

## 2018-02-24 RX ADMIN — MAGNESIUM GLUCONATE 500 MG ORAL TABLET 400 MG: 500 TABLET ORAL at 13:24

## 2018-02-24 RX ADMIN — ENOXAPARIN SODIUM 40 MG: 100 INJECTION SUBCUTANEOUS at 18:30

## 2018-02-24 RX ADMIN — AMPICILLIN SODIUM AND SULBACTAM SODIUM 3 G: 2; 1 INJECTION, POWDER, FOR SOLUTION INTRAMUSCULAR; INTRAVENOUS at 13:17

## 2018-02-24 RX ADMIN — LISINOPRIL 10 MG: 10 TABLET ORAL at 13:30

## 2018-02-24 RX ADMIN — AMPICILLIN SODIUM AND SULBACTAM SODIUM 3 G: 2; 1 INJECTION, POWDER, FOR SOLUTION INTRAMUSCULAR; INTRAVENOUS at 05:57

## 2018-02-24 RX ADMIN — FOLIC ACID 5 MG: 1 TABLET ORAL at 13:23

## 2018-02-24 RX ADMIN — NICOTINE 14 MG: 14 PATCH, EXTENDED RELEASE TRANSDERMAL at 06:01

## 2018-02-24 RX ADMIN — SPIRONOLACTONE 12.5 MG: 25 TABLET ORAL at 13:28

## 2018-02-24 RX ADMIN — FENTANYL CITRATE 50 MCG: 50 INJECTION INTRAMUSCULAR; INTRAVENOUS at 10:05

## 2018-02-24 RX ADMIN — OMEPRAZOLE 20 MG: 20 CAPSULE, DELAYED RELEASE ORAL at 06:01

## 2018-02-24 RX ADMIN — TAMSULOSIN HYDROCHLORIDE 0.4 MG: 0.4 CAPSULE ORAL at 21:38

## 2018-02-24 RX ADMIN — ATORVASTATIN CALCIUM 80 MG: 80 TABLET, FILM COATED ORAL at 21:38

## 2018-02-24 RX ADMIN — MIDAZOLAM HYDROCHLORIDE 4 MG: 1 INJECTION, SOLUTION INTRAMUSCULAR; INTRAVENOUS at 10:04

## 2018-02-24 RX ADMIN — BUDESONIDE AND FORMOTEROL FUMARATE DIHYDRATE 2 PUFF: 80; 4.5 AEROSOL RESPIRATORY (INHALATION) at 09:03

## 2018-02-24 RX ADMIN — POTASSIUM CHLORIDE 20 MEQ: 1500 TABLET, EXTENDED RELEASE ORAL at 13:23

## 2018-02-24 RX ADMIN — LEVOTHYROXINE SODIUM ANHYDROUS 150 MCG: 100 INJECTION, POWDER, LYOPHILIZED, FOR SOLUTION INTRAVENOUS at 09:16

## 2018-02-24 RX ADMIN — FUROSEMIDE 20 MG: 20 TABLET ORAL at 13:28

## 2018-02-24 RX ADMIN — FUROSEMIDE 20 MG: 10 INJECTION, SOLUTION INTRAMUSCULAR; INTRAVENOUS at 06:01

## 2018-02-24 RX ADMIN — TIOTROPIUM BROMIDE 1 CAPSULE: 18 CAPSULE ORAL; RESPIRATORY (INHALATION) at 09:04

## 2018-02-24 RX ADMIN — OXYCODONE HYDROCHLORIDE 10 MG: 10 TABLET ORAL at 21:38

## 2018-02-24 RX ADMIN — AMPICILLIN SODIUM AND SULBACTAM SODIUM 3 G: 2; 1 INJECTION, POWDER, FOR SOLUTION INTRAMUSCULAR; INTRAVENOUS at 18:31

## 2018-02-24 RX ADMIN — ASPIRIN 81 MG: 81 TABLET, CHEWABLE ORAL at 13:23

## 2018-02-24 RX ADMIN — MAGNESIUM GLUCONATE 500 MG ORAL TABLET 400 MG: 500 TABLET ORAL at 21:38

## 2018-02-24 RX ADMIN — AMPICILLIN SODIUM AND SULBACTAM SODIUM 3 G: 2; 1 INJECTION, POWDER, FOR SOLUTION INTRAMUSCULAR; INTRAVENOUS at 00:07

## 2018-02-24 RX ADMIN — OXYCODONE HYDROCHLORIDE 10 MG: 10 TABLET ORAL at 06:01

## 2018-02-24 RX ADMIN — CARVEDILOL 3.12 MG: 3.12 TABLET, FILM COATED ORAL at 13:24

## 2018-02-24 RX ADMIN — OXYCODONE HYDROCHLORIDE 10 MG: 10 TABLET ORAL at 00:07

## 2018-02-24 ASSESSMENT — ENCOUNTER SYMPTOMS
VOMITING: 0
WEAKNESS: 1
DIZZINESS: 0
HEARTBURN: 0
COUGH: 0
NAUSEA: 0
ABDOMINAL PAIN: 0
SHORTNESS OF BREATH: 0
DIARRHEA: 0
BLURRED VISION: 0
FEVER: 0
WHEEZING: 0
CHILLS: 0
SORE THROAT: 0

## 2018-02-24 ASSESSMENT — PAIN SCALES - GENERAL
PAINLEVEL_OUTOF10: 8
PAINLEVEL_OUTOF10: 5
PAINLEVEL_OUTOF10: 6
PAINLEVEL_OUTOF10: 6
PAINLEVEL_OUTOF10: 0
PAINLEVEL_OUTOF10: 0

## 2018-02-24 NOTE — PROGRESS NOTES
[facility] Progress Note    Date of Service: 2018    Chief Complaint  57 y.o. male admitted 2018 with Sepsis, Pneumonia, Respiratory failure, CHF exacerbation.    Interval Problem Update  PNA - feels much better  Resp failure - back to baseline O2 2 lpm  CHF - EF 20%, baseline?, for cardiac cath today  LFTs - trending down  Hypothyroid - TSH 90    Consultants/Specialty  Cardiology - [place]    Disposition  TBD        Review of Systems   Constitutional: Positive for malaise/fatigue. Negative for chills and fever.   HENT: Negative for hearing loss and sore throat.    Eyes: Negative for blurred vision.   Respiratory: Negative for cough, shortness of breath and wheezing.    Cardiovascular: Negative for chest pain and leg swelling.   Gastrointestinal: Negative for abdominal pain, diarrhea, heartburn, nausea and vomiting.   Genitourinary: Negative for dysuria.   Neurological: Positive for weakness. Negative for dizziness.      Physical Exam  Laboratory/Imaging   Hemodynamics  Temp (24hrs), Av.6 °C (97.9 °F), Min:36.3 °C (97.3 °F), Max:37 °C (98.6 °F)   Temperature: 36.4 °C (97.5 °F)  Pulse  Av.9  Min: 62  Max: 116   Blood Pressure: 110/70      Respiratory      Respiration: 20, Pulse Oximetry: 98 %        RUL Breath Sounds: Clear, RML Breath Sounds: Diminished, RLL Breath Sounds: Diminished, EZE Breath Sounds: Clear, LLL Breath Sounds: Diminished    Fluids    Intake/Output Summary (Last 24 hours) at 18 1642  Last data filed at 18 1353   Gross per 24 hour   Intake              910 ml   Output             1600 ml   Net             -690 ml       Nutrition  Orders Placed This Encounter   Procedures   • Diet Order     Standing Status:   Standing     Number of Occurrences:   1     Order Specific Question:   Diet:     Answer:   Cardiac [6]     Physical Exam   Constitutional: He is oriented to person, place, and time. He appears well-developed and well-nourished.   HENT:   Head:  Normocephalic and atraumatic.   Eyes: Conjunctivae are normal. Pupils are equal, round, and reactive to light.   Neck: No tracheal deviation present. No thyromegaly present.   Cardiovascular: Normal rate and regular rhythm.    Pulmonary/Chest: Effort normal. He has decreased breath sounds in the right lower field and the left lower field. He has rales.   Abdominal: Soft. Bowel sounds are normal. He exhibits distension. There is no tenderness. There is no rebound and no guarding.   Musculoskeletal: He exhibits no edema.   Lymphadenopathy:     He has no cervical adenopathy.   Neurological: He is alert and oriented to person, place, and time.   Skin: Skin is warm and dry.   Nursing note and vitals reviewed.      Recent Labs      02/22/18   0257 02/22/18   1650 02/23/18   0319   WBC  12.3*  12.2*  10.4   RBC  4.58*  4.81  4.50*   HEMOGLOBIN  12.9*  13.6*  12.6*   HEMATOCRIT  40.3*  43.1  39.8*   MCV  88.0  89.6  88.4   MCH  28.2  28.3  28.0   MCHC  32.0*  31.6*  31.7*   RDW  52.7*  54.4*  53.5*   PLATELETCT  262  294  274   MPV  10.3  10.2  10.6     Recent Labs      02/22/18   0257  02/22/18   1650  02/23/18   0319   SODIUM  137  137  138   POTASSIUM  3.9  4.1  4.2   CHLORIDE  98  97  101   CO2  31  32  31   GLUCOSE  100*  107*  98   BUN  24*  26*  27*   CREATININE  1.16  1.26  1.09   CALCIUM  8.9  9.0  8.2*     Recent Labs      02/22/18   1650   INR  1.06     Recent Labs      02/21/18   0202  02/22/18   0257  02/23/18   0319   BNPBTYPENAT  1113*  1509*  1492*              Assessment/Plan     * CAP (community acquired pneumonia)- (present on admission)   Assessment & Plan    IV Unasyn and Azithromycin          Acute and chronic respiratory failure (acute-on-chronic) (CMS-HCC)- (present on admission)   Assessment & Plan    Keep O2 sats above 90%        Sepsis (CMS-[facility])- (present on admission)   Assessment & Plan    This is sepsis (without associated acute organ dysfunction).   Source is pneumonia          CHF, acute on  chronic (CMS-[facility])- (present on admission)   Assessment & Plan    Acute on Chronic Systolic CHF  IV Lasix, Coreg  Cardiac cath today        S/P MVR (mitral valve replacement)- (present on admission)   Assessment & Plan    stable        Hypomagnesemia- (present on admission)   Assessment & Plan    oral Mg        Renal insufficiency   Assessment & Plan    IV Lasix  Follow bmp        Pleural effusion- (present on admission)   Assessment & Plan    IV Lasix        Hypothyroid- (present on admission)   Assessment & Plan    Increase IV Synthroid to 150 mcg        COPD (chronic obstructive pulmonary disease) (CMS-[facility])- (present on admission)   Assessment & Plan    Spiriva, Symbicort, RT protocol        Elevated troponin- (present on admission)   Assessment & Plan    ASA        Transaminitis- (present on admission)   Assessment & Plan    Likely secondary to hepatic congestion  Follow cmp        Tobacco abuse- (present on admission)   Assessment & Plan    Nicotine replacement             Quality-Core Measures   Reviewed items::  EKG reviewed, Radiology images reviewed, Labs reviewed and Medications reviewed  Villareal catheter::  Urinary Tract Retention or Urinary Tract Obstruction  DVT prophylaxis pharmacological::  Enoxaparin (Lovenox)  Ulcer Prophylaxis::  Yes  Antibiotics:  Treating active infection/contamination beyond 24 hours perioperative coverage

## 2018-02-24 NOTE — RESPIRATORY CARE
Conscious Sedation Respiratory Update       O2 (LPM): 99 (02/24/18 0956)  O2 Daily Delivery Respiratory : Silicone Nasal Cannula (02/20/18 0005)       Conscious sedation for KATY pt tolerated well, vitals per RN charting.

## 2018-02-24 NOTE — PROGRESS NOTES
Pt in procedure room for transesophageal echo, time out was completed, patient consent obtained, vital signs stable. During the procedure pt received 1 mL fentanyl and 4 mL versed.

## 2018-02-24 NOTE — CARE PLAN
Problem: Infection  Goal: Will remain free from infection  Outcome: PROGRESSING AS EXPECTED  Pt monitored for signs and symptoms of infection. Vitals and labs assessed and monitored for signs of infection. Proper hand hygiene performed prior to entering and exiting pt's room. Pt educated on proper hand hygiene.

## 2018-02-24 NOTE — PROGRESS NOTES
Pt waking up from sedation. Vitals still stable. Dysphagia reassessed and approved, now eating meal.

## 2018-02-24 NOTE — PROGRESS NOTES
Cardiology Progress Note                                Admit Date: 2/19/2018  Resident(s): Yojana Frias     Date & Time:   2/23/2018   4:23 PM       Patient ID:    Name:             Phillip Ann     YOB: 1960  Age:                 57 y.o.  male   MRN:               [id number]    HPI:    Phillip Ann is a pleasant 56 yo man with PMHx of methamphetamine abuse (reportedly in remission), MI, mitral valve replacement s/p bioprosthetic valve replacement (2017 in [facility]), HTN and tobacco abuse who is admitted for sepsis due to pneumonia. Cardiology is consulted for new diagnosis of CHF with reduced EF 20%.     Interval History:    Underwent left and right heart catheterization: occlusive but not flow limiting CAD with mid LAD 50% stenosis and no significant pulmonary hypertension (pulm pressure 25/19 with mean 20 mmHg and mean PCWP 13 mmHg). LVEF severely reduced 22% with global hypokinesis; no significant mitral regurg on left ventriculography     ROS   Constitutional: Denies fevers, Denies weight changes  Eyes: Denies changes in vision, no eye pain  Ears/Nose/Throat/Mouth: Denies nasal congestion or sore throat   Cardiovascular: denies chest pain,  Palpitations. Significant orthopnea.   Respiratory: significant exertional and resting shortness of breath , some cough.  Gastrointestinal/Hepatic: Denies abdominal pain, nausea, vomiting, diarrhea, constipation or GI bleeding   Genitourinary: Denies dysuria or frequency  Musculoskeletal/Rheum: Denies  joint pain and swelling, no edema  Skin: Denies rash  Neurological: Denies headache, confusion, memory loss or focal weakness/parasthesias  Psychiatric: denies mood disorder   Endocrine: Keila thyroid problems  Heme/Oncology/Lymph Nodes: Denies enlarged lymph nodes, denies brusing or known bleeding disorder  All other systems were reviewed and are negative (AMA/CMS criteria)              Physical Exam   Blood pressure 117/70, pulse 97, temperature 36.9  "°C (98.5 °F), resp. rate 18, height 1.702 m (5' 7\"), weight 67.5 kg (148 lb 13 oz), SpO2 94 %.    Constitutional:   Ill appearing, thin man. No acute distress  HENMT:  Normocephalic, Atraumatic, Oropharynx moist mucous membranes, No oral exudates, Nose normal.  No thyromegaly.  Eyes:  EOMI, Conjunctiva normal, No discharge.  Neck:  Normal range of motion, No cervical tenderness,  no JVD.  Cardiovascular:  Normal heart rate, Normal rhythm, No murmurs, No rubs, No gallops.  Extremitites with intact distal pulses, no cyanosis, or edema.  Lungs: Crackles bilaterally up to lower 1/3 of chest wall, no wheezing or rhonchi.   Abdomen: Bowel sounds normal, Soft, No tenderness, No guarding, No rebound, No masses, No hepatosplenomegaly.  Skin: Warm, Dry, No erythema, No rash, no induration.  Neurologic: Alert & oriented x 3, No focal deficits noted, cranial nerves II through X are grossly intact.  Psychiatric: Affect normal, Judgment normal, Mood normal.          Fluids:    Date 02/23/18 0700 - 02/24/18 0659   Shift 5890-6147 7031-0944 [id number] 24 Hour Total   I  N  T  A  K  E   P.O. 240   240      P.O. 240   240    Shift Total 240   240   O  U  T  P  U  T   Urine 400   400      Void (ml) 400   400    Shift Total 400   400   NET -160   -160        Intake/Output Summary (Last 24 hours) at 02/23/18 1623  Last data filed at 02/23/18 1353   Gross per 24 hour   Intake              910 ml   Output             1600 ml   Net             -690 ml          Body mass index is 23.31 kg/m².    Recent Labs      02/22/18   0257  02/22/18   1650  02/23/18   0319   SODIUM  137  137  138   POTASSIUM  3.9  4.1  4.2   CHLORIDE  98  97  101   CO2  31  32  31   BUN  24*  26*  27*   CREATININE  1.16  1.26  1.09   CALCIUM  8.9  9.0  8.2*         Recent Labs      02/22/18   0257  02/22/18   1650  02/23/18   0319   WBC  12.3*  12.2*  10.4   RBC  4.58*  4.81  4.50*   HEMOGLOBIN  12.9*  13.6*  12.6*   HEMATOCRIT  40.3*  43.1  39.8*   MCV  88.0  89.6  " 88.4   MCH  28.2  28.3  28.0   MCHC  32.0*  31.6*  31.7*   RDW  52.7*  54.4*  53.5*   PLATELETCT  262  294  274   MPV  10.3  10.2  10.6     Recent Labs      02/22/18   0257  02/22/18   1650  02/23/18   0319   SODIUM  137  137  138   POTASSIUM  3.9  4.1  4.2   CHLORIDE  98  97  101   CO2  31  32  31   GLUCOSE  100*  107*  98   BUN  24*  26*  27*   CREATININE  1.16  1.26  1.09   CALCIUM  8.9  9.0  8.2*     Recent Labs      02/22/18   1650   INR  1.06     Recent Labs      02/21/18   0202  02/22/18   0257  02/23/18   0319   BNPBTYPENAT  1113*  1509*  1492*     Recent Labs      02/21/18   0202  02/22/18   0257  02/23/18   0319   BNPBTYPENAT  1113*  1509*  1492*           Meds:  • [START ON 2/24/2018] levothyroxine  150 mcg     • ondansetron  4 mg     • dexamethasone  4 mg     • diphenhydrAMINE  25 mg     • haloperidol lactate  1 mg     • scopolamine  1 Patch     • acetaminophen  650 mg     • oxyCODONE immediate-release  5 mg      Or   • oxyCODONE immediate-release  10 mg     • carvedilol  3.125 mg     • potassium chloride SA  20 mEq     • furosemide  20 mg     • aspirin  81 mg     • budesonide-formoterol  2 Puff     • tiotropium  1 Cap     • magnesium oxide  400 mg     • calcium carbonate  1,000 mg     • senna-docusate  2 Tab      And   • polyethylene glycol/lytes  1 Packet      And   • magnesium hydroxide  30 mL      And   • bisacodyl  10 mg     • Respiratory Care per Protocol       • enoxaparin  40 mg     • acetaminophen  650 mg     • ampicillin-sulbactam (UNASYN) IV  3 g Stopped (02/23/18 1248)   • azithromycin  250 mg     • ondansetron  4 mg     • ondansetron  4 mg     • nicotine  14 mg      And   • nicotine polacrilex  2 mg     • folic acid  5 mg     • omeprazole  20 mg     • tamsulosin  0.4 mg     • ipratropium-albuterol  3 mL         Current Facility-Administered Medications   Medication Dose Frequency Provider Last Rate Last Dose   • [START ON 2/24/2018] levothyroxine (SYNTHROID) injection 150 mcg  150 mcg DAILY  Larry Mittal M.D.       • ondansetron (ZOFRAN) syringe/vial injection 4 mg  4 mg Q4HRS PRN Do Garces M.D.       • dexamethasone (DECADRON) injection 4 mg  4 mg Once PRN Do Garces M.D.       • diphenhydrAMINE (BENADRYL) injection 25 mg  25 mg Q6HRS PRN Do Garces M.D.       • haloperidol lactate (HALDOL) injection 1 mg  1 mg Q6HRS PRN Do Garces M.D.       • scopolamine (TRANSDERM-SCOP) patch 1 Patch  1 Patch Q72HRS PRN Do Garces M.D.       • acetaminophen (TYLENOL) tablet 650 mg  650 mg Q6HRS PRN Do Garces M.D.       • oxyCODONE immediate-release (ROXICODONE) tablet 5 mg  5 mg Q4HRS PRN Larry Mittal M.D.   5 mg at 02/23/18 0823    Or   • oxyCODONE immediate release (ROXICODONE) tablet 10 mg  10 mg Q4HRS PRN Larry Mittal M.D.       • carvedilol (COREG) tablet 3.125 mg  3.125 mg BID WITH MEALS Larry Mittal M.D.   3.125 mg at 02/23/18 0818   • potassium chloride SA (Kdur) tablet 20 mEq  20 mEq BID Larry Mittal M.D.   20 mEq at 02/23/18 0818   • furosemide (LASIX) injection 20 mg  20 mg BID DIURETIC Larry Mittal M.D.   20 mg at 02/23/18 1606   • aspirin (ASA) chewable tab 81 mg  81 mg DAILY Willi Guerrero M.D.   81 mg at 02/23/18 0817   • budesonide-formoterol (SYMBICORT) 80-4.5 MCG/ACT inhaler 2 Puff  2 Puff BID Willi Guerrero M.D.   2 Puff at 02/23/18 0818   • tiotropium (SPIRIVA) 18 MCG inhalation capsule 1 Cap  1 Cap DAILY Willi Yolanda, M.D.   1 Cap at 02/23/18 0818   • magnesium oxide (MAG-OX) tablet 400 mg  400 mg BID Larry Mittal M.D.   400 mg at 02/23/18 0818   • calcium carbonate (TUMS) chewable tab 1,000 mg  1,000 mg TID PRN Wen Stokes M.D.   1,000 mg at 02/21/18 0005   • senna-docusate (PERICOLACE or SENOKOT S) 8.6-50 MG per tablet 2 Tab  2 Tab BID Willi Guerrero M.D.   2 Tab at 02/22/18 2004    And   • polyethylene glycol/lytes (MIRALAX) PACKET 1 Packet  1 Packet QDAY PRN Willi Guerrero M.D.         And   • magnesium hydroxide (MILK OF MAGNESIA) suspension 30 mL  30 mL QDAY PRN Willi Guerrero M.D.        And   • bisacodyl (DULCOLAX) suppository 10 mg  10 mg QDAY PRN Willi Guerrero M.D.       • Respiratory Care per Protocol   Continuous RT Willi Guerrero M.D.       • enoxaparin (LOVENOX) inj 40 mg  40 mg DAILY Willi Guerrero M.D.   40 mg at 02/22/18 1752   • acetaminophen (TYLENOL) tablet 650 mg  650 mg Q6HRS PRN Willi Guerrero M.D.   650 mg at 02/21/18 0223   • ampicillin/sulbactam (UNASYN) 3 g in  mL IVPB  3 g Q6HRS Willi Guerrero M.D.   Stopped at 02/23/18 1248   • azithromycin (ZITHROMAX) tablet 250 mg  250 mg DAILY Willi Guerrero M.D.   250 mg at 02/22/18 1644   • ondansetron (ZOFRAN) syringe/vial injection 4 mg  4 mg Q4HRS PRN Willi Guerrero M.D.       • ondansetron (ZOFRAN ODT) dispertab 4 mg  4 mg Q4HRS PRN Willi Guerrero M.D.       • nicotine (NICODERM) 14 MG/24HR 14 mg  14 mg Daily-0600 Willi Guerrero M.D.   14 mg at 02/23/18 0521    And   • nicotine polacrilex (NICORETTE) 2 MG piece 2 mg  2 mg Q HOUR PRN Willi Guerrero M.D.       • folic acid (FOLVITE) tablet 5 mg  5 mg DAILY Willi Guerrero M.D.   5 mg at 02/23/18 0817   • omeprazole (PRILOSEC) capsule 20 mg  20 mg BID AC Willi Guerrero M.D.   20 mg at 02/23/18 0521   • tamsulosin (FLOMAX) capsule 0.4 mg  0.4 mg QHS Willi Guerrero M.D.   0.4 mg at 02/22/18 2003   • ipratropium-albuterol (DUONEB) nebulizer solution 3 mL  3 mL Q4H PRN (RT) Willi Guerrero M.D.         Last reviewed on 2/19/2018  4:15 PM by Promise Lucio PhT  Medications reviewed      Imaging reviewed    EKG:   As in HPI. Sinus tachycardia, poor R wave progression.       Telemetry   No further episodes of non sustained ventricular tach overnight. 4 beats wide complex tachy on 2/21/18      ECHO:     CONCLUSIONS  No prior study is available for comparison.   Known mitral valve bioprosthesis - mean transvalvular gradient is 12    mmHg at a heart rate of 110 BPM. This increased gradient could be   related to  tachycardia. However, a transesophageal echocardiogram   should be done to further evaluate if clinically indicated.   Preferentially, this should be done when heart rate is better   controlled.  Severely reduced left ventricular systolic function. Left ventricular   ejection fraction is visually estimated to be 20%.  Moderate tricuspid regurgitation.   Estimated right ventricular systolic pressure  is 60 mmHg.     Impressions and Recommendations:    58 yo man with reported prior MI, methamphetamine abuse (in remission), MVR s/p bioprosthetic valve admitted with new diagnosis of HFrEF with acute exacerbation in the setting of sepsis from likely pneumonia. Now s/p left and right heart cath       1. Severely reduced LVEF 22% with global hypokinesis with some obstructive but now flow limiting coronary artery disease. Continue current medical management with carvedilol (increased to max tolerated dose), aspirin, diuretics for symptomatic relief and to start ACE-I once sepsis resolves.   2. Recommend once daily dosing of IV furosemide and discontinuing furosemide 20 bid.   3. Start high dose statin medication: atorvastatin 80 mg daily.   4. In out patient setting, patient may benefit from starting sacubitril-valsartan. But this is to be discussed in his heart failure clinic on out patient basis.   5. KATY scheduled for tomorrow morning. Keep NPO at midnight.   6. Smoking cessation emphasized. Complete cessation from all illicit drugs and alcohol.       Discussed with Dr. Howe.

## 2018-02-25 LAB
ALBUMIN SERPL BCP-MCNC: 3.4 G/DL (ref 3.2–4.9)
ALBUMIN/GLOB SERPL: 1.3 G/DL
ALP SERPL-CCNC: 137 U/L (ref 30–99)
ALT SERPL-CCNC: 97 U/L (ref 2–50)
ANION GAP SERPL CALC-SCNC: 7 MMOL/L (ref 0–11.9)
AST SERPL-CCNC: 26 U/L (ref 12–45)
BASOPHILS # BLD AUTO: 1 % (ref 0–1.8)
BASOPHILS # BLD: 0.09 K/UL (ref 0–0.12)
BILIRUB SERPL-MCNC: 0.5 MG/DL (ref 0.1–1.5)
BNP SERPL-MCNC: 634 PG/ML (ref 0–100)
BUN SERPL-MCNC: 31 MG/DL (ref 8–22)
CALCIUM SERPL-MCNC: 8.8 MG/DL (ref 8.5–10.5)
CHLORIDE SERPL-SCNC: 99 MMOL/L (ref 96–112)
CO2 SERPL-SCNC: 27 MMOL/L (ref 20–33)
CREAT SERPL-MCNC: 1.17 MG/DL (ref 0.5–1.4)
EOSINOPHIL # BLD AUTO: 0.93 K/UL (ref 0–0.51)
EOSINOPHIL NFR BLD: 9.9 % (ref 0–6.9)
ERYTHROCYTE [DISTWIDTH] IN BLOOD BY AUTOMATED COUNT: 52.5 FL (ref 35.9–50)
GLOBULIN SER CALC-MCNC: 2.7 G/DL (ref 1.9–3.5)
GLUCOSE SERPL-MCNC: 95 MG/DL (ref 65–99)
HCT VFR BLD AUTO: 37.1 % (ref 42–52)
HGB BLD-MCNC: 11.7 G/DL (ref 14–18)
IMM GRANULOCYTES # BLD AUTO: 0.05 K/UL (ref 0–0.11)
IMM GRANULOCYTES NFR BLD AUTO: 0.5 % (ref 0–0.9)
LYMPHOCYTES # BLD AUTO: 1.51 K/UL (ref 1–4.8)
LYMPHOCYTES NFR BLD: 16 % (ref 22–41)
MCH RBC QN AUTO: 28 PG (ref 27–33)
MCHC RBC AUTO-ENTMCNC: 31.5 G/DL (ref 33.7–35.3)
MCV RBC AUTO: 88.8 FL (ref 81.4–97.8)
MONOCYTES # BLD AUTO: 1 K/UL (ref 0–0.85)
MONOCYTES NFR BLD AUTO: 10.6 % (ref 0–13.4)
NEUTROPHILS # BLD AUTO: 5.83 K/UL (ref 1.82–7.42)
NEUTROPHILS NFR BLD: 62 % (ref 44–72)
NRBC # BLD AUTO: 0 K/UL
NRBC BLD-RTO: 0 /100 WBC
PLATELET # BLD AUTO: 255 K/UL (ref 164–446)
PMV BLD AUTO: 10.3 FL (ref 9–12.9)
POTASSIUM SERPL-SCNC: 4.3 MMOL/L (ref 3.6–5.5)
PROT SERPL-MCNC: 6.1 G/DL (ref 6–8.2)
RBC # BLD AUTO: 4.18 M/UL (ref 4.7–6.1)
SODIUM SERPL-SCNC: 133 MMOL/L (ref 135–145)
WBC # BLD AUTO: 9.4 K/UL (ref 4.8–10.8)

## 2018-02-25 PROCEDURE — 99233 SBSQ HOSP IP/OBS HIGH 50: CPT | Performed by: FAMILY MEDICINE

## 2018-02-25 PROCEDURE — 80053 COMPREHEN METABOLIC PANEL: CPT

## 2018-02-25 PROCEDURE — 36415 COLL VENOUS BLD VENIPUNCTURE: CPT

## 2018-02-25 PROCEDURE — A9270 NON-COVERED ITEM OR SERVICE: HCPCS | Performed by: INTERNAL MEDICINE

## 2018-02-25 PROCEDURE — 700102 HCHG RX REV CODE 250 W/ 637 OVERRIDE(OP): Performed by: HOSPITALIST

## 2018-02-25 PROCEDURE — A9270 NON-COVERED ITEM OR SERVICE: HCPCS | Performed by: STUDENT IN AN ORGANIZED HEALTH CARE EDUCATION/TRAINING PROGRAM

## 2018-02-25 PROCEDURE — A9270 NON-COVERED ITEM OR SERVICE: HCPCS | Performed by: HOSPITALIST

## 2018-02-25 PROCEDURE — 700105 HCHG RX REV CODE 258: Performed by: FAMILY MEDICINE

## 2018-02-25 PROCEDURE — A9270 NON-COVERED ITEM OR SERVICE: HCPCS | Performed by: FAMILY MEDICINE

## 2018-02-25 PROCEDURE — 700111 HCHG RX REV CODE 636 W/ 250 OVERRIDE (IP): Performed by: FAMILY MEDICINE

## 2018-02-25 PROCEDURE — 83880 ASSAY OF NATRIURETIC PEPTIDE: CPT

## 2018-02-25 PROCEDURE — 700105 HCHG RX REV CODE 258: Performed by: INTERNAL MEDICINE

## 2018-02-25 PROCEDURE — 700102 HCHG RX REV CODE 250 W/ 637 OVERRIDE(OP): Performed by: FAMILY MEDICINE

## 2018-02-25 PROCEDURE — 770020 HCHG ROOM/CARE - TELE (206)

## 2018-02-25 PROCEDURE — 700101 HCHG RX REV CODE 250: Performed by: FAMILY MEDICINE

## 2018-02-25 PROCEDURE — 85025 COMPLETE CBC W/AUTO DIFF WBC: CPT

## 2018-02-25 PROCEDURE — 700102 HCHG RX REV CODE 250 W/ 637 OVERRIDE(OP): Performed by: INTERNAL MEDICINE

## 2018-02-25 PROCEDURE — 700111 HCHG RX REV CODE 636 W/ 250 OVERRIDE (IP): Performed by: INTERNAL MEDICINE

## 2018-02-25 PROCEDURE — 700102 HCHG RX REV CODE 250 W/ 637 OVERRIDE(OP): Performed by: STUDENT IN AN ORGANIZED HEALTH CARE EDUCATION/TRAINING PROGRAM

## 2018-02-25 RX ORDER — SPIRONOLACTONE 25 MG/1
12.5 TABLET ORAL
Status: DISCONTINUED | OUTPATIENT
Start: 2018-02-25 | End: 2018-02-26 | Stop reason: HOSPADM

## 2018-02-25 RX ORDER — FUROSEMIDE 20 MG/1
20 TABLET ORAL
Status: DISCONTINUED | OUTPATIENT
Start: 2018-02-25 | End: 2018-02-25

## 2018-02-25 RX ORDER — LISINOPRIL 5 MG/1
2.5 TABLET ORAL
Status: DISCONTINUED | OUTPATIENT
Start: 2018-02-25 | End: 2018-02-26 | Stop reason: HOSPADM

## 2018-02-25 RX ADMIN — TAMSULOSIN HYDROCHLORIDE 0.4 MG: 0.4 CAPSULE ORAL at 21:41

## 2018-02-25 RX ADMIN — NICOTINE 14 MG: 14 PATCH, EXTENDED RELEASE TRANSDERMAL at 06:05

## 2018-02-25 RX ADMIN — OMEPRAZOLE 20 MG: 20 CAPSULE, DELAYED RELEASE ORAL at 17:29

## 2018-02-25 RX ADMIN — CARVEDILOL 6.25 MG: 6.25 TABLET, FILM COATED ORAL at 09:03

## 2018-02-25 RX ADMIN — ENOXAPARIN SODIUM 40 MG: 100 INJECTION SUBCUTANEOUS at 21:39

## 2018-02-25 RX ADMIN — AMPICILLIN SODIUM AND SULBACTAM SODIUM 3 G: 2; 1 INJECTION, POWDER, FOR SOLUTION INTRAMUSCULAR; INTRAVENOUS at 12:23

## 2018-02-25 RX ADMIN — FUROSEMIDE 20 MG: 20 TABLET ORAL at 09:12

## 2018-02-25 RX ADMIN — BUDESONIDE AND FORMOTEROL FUMARATE DIHYDRATE 2 PUFF: 80; 4.5 AEROSOL RESPIRATORY (INHALATION) at 00:29

## 2018-02-25 RX ADMIN — MAGNESIUM GLUCONATE 500 MG ORAL TABLET 400 MG: 500 TABLET ORAL at 21:40

## 2018-02-25 RX ADMIN — CARVEDILOL 6.25 MG: 6.25 TABLET, FILM COATED ORAL at 17:29

## 2018-02-25 RX ADMIN — ATORVASTATIN CALCIUM 80 MG: 80 TABLET, FILM COATED ORAL at 21:41

## 2018-02-25 RX ADMIN — MAGNESIUM GLUCONATE 500 MG ORAL TABLET 400 MG: 500 TABLET ORAL at 09:07

## 2018-02-25 RX ADMIN — BUDESONIDE AND FORMOTEROL FUMARATE DIHYDRATE 2 PUFF: 80; 4.5 AEROSOL RESPIRATORY (INHALATION) at 09:03

## 2018-02-25 RX ADMIN — OMEPRAZOLE 20 MG: 20 CAPSULE, DELAYED RELEASE ORAL at 06:06

## 2018-02-25 RX ADMIN — AMPICILLIN SODIUM AND SULBACTAM SODIUM 3 G: 2; 1 INJECTION, POWDER, FOR SOLUTION INTRAMUSCULAR; INTRAVENOUS at 00:29

## 2018-02-25 RX ADMIN — AMPICILLIN SODIUM AND SULBACTAM SODIUM 3 G: 2; 1 INJECTION, POWDER, FOR SOLUTION INTRAMUSCULAR; INTRAVENOUS at 06:01

## 2018-02-25 RX ADMIN — OXYCODONE HYDROCHLORIDE 10 MG: 10 TABLET ORAL at 21:41

## 2018-02-25 RX ADMIN — ASPIRIN 81 MG: 81 TABLET, CHEWABLE ORAL at 09:03

## 2018-02-25 RX ADMIN — LEVOTHYROXINE SODIUM ANHYDROUS 150 MCG: 100 INJECTION, POWDER, LYOPHILIZED, FOR SOLUTION INTRAVENOUS at 09:09

## 2018-02-25 RX ADMIN — LISINOPRIL 2.5 MG: 5 TABLET ORAL at 09:04

## 2018-02-25 RX ADMIN — AMPICILLIN SODIUM AND SULBACTAM SODIUM 3 G: 2; 1 INJECTION, POWDER, FOR SOLUTION INTRAMUSCULAR; INTRAVENOUS at 23:00

## 2018-02-25 RX ADMIN — FOLIC ACID 5 MG: 1 TABLET ORAL at 09:04

## 2018-02-25 RX ADMIN — BUDESONIDE AND FORMOTEROL FUMARATE DIHYDRATE 2 PUFF: 80; 4.5 AEROSOL RESPIRATORY (INHALATION) at 21:52

## 2018-02-25 RX ADMIN — AMPICILLIN SODIUM AND SULBACTAM SODIUM 3 G: 2; 1 INJECTION, POWDER, FOR SOLUTION INTRAMUSCULAR; INTRAVENOUS at 17:29

## 2018-02-25 RX ADMIN — SPIRONOLACTONE 12.5 MG: 25 TABLET, FILM COATED ORAL at 09:07

## 2018-02-25 ASSESSMENT — ENCOUNTER SYMPTOMS
CHILLS: 0
FEVER: 0
ABDOMINAL PAIN: 0
WEAKNESS: 1
DIARRHEA: 0
HEARTBURN: 0
PND: 0
DIAPHORESIS: 0
BLURRED VISION: 0
PALPITATIONS: 0
NAUSEA: 0
VOMITING: 0
BLOOD IN STOOL: 0
DIZZINESS: 0
WHEEZING: 0
COUGH: 0
SORE THROAT: 0
SHORTNESS OF BREATH: 0

## 2018-02-25 ASSESSMENT — PAIN SCALES - GENERAL
PAINLEVEL_OUTOF10: 7
PAINLEVEL_OUTOF10: 0
PAINLEVEL_OUTOF10: 6

## 2018-02-25 NOTE — PROGRESS NOTES
Bedside report completed with deborah RN. Pt sleeping. Bed locked in low position, call light within reach. Bed alarm off, pt is up self independent. Reviewed POC with noc RN

## 2018-02-25 NOTE — PROGRESS NOTES
"Cardiology Progress Note                                            Admit Date: 2/19/2018  Resident(s): Ana Diggs     Date & Time:   2/25/2018   11:08 AM       Patient ID:    Name:             Phillip Ann     YOB: 1960  Age:                 57 y.o.  male   MRN:               [id number]    HPI:  Phillip Ann is a pleasant 56 yo man with PMHx of methamphetamine abuse (reportedly in remission), MI, mitral valve replacement s/p bioprosthetic valve replacement (2017 in [facility]), HTN and tobacco abuse who is admitted for sepsis due to pneumonia. Cardiology is consulted for new diagnosis of CHF with reduced EF 20%.     Interval History:  - S/p LHC with no flow limiting CAD (2/24), doing better with no new complaints.  - Breathing improved to his baseline, Diuresing well with Negative balance 2.6L in last 24 hours.  - SR-ST , coreg 6.25, will uptitrate as tolerated.  - BP stable.  - Counseled regarding smoking, Drug use, Low salt diet and exercise.     Review of Systems   Constitutional: Negative for diaphoresis and malaise/fatigue.   HENT: Negative for sore throat.    Respiratory: Negative for cough, shortness of breath and wheezing.    Cardiovascular: Negative for chest pain, palpitations, leg swelling and PND.   Gastrointestinal: Negative for blood in stool.       Physical Exam   Blood pressure 103/74, pulse 97, temperature 36.4 °C (97.5 °F), resp. rate 16, height 1.702 m (5' 7\"), weight 65.3 kg (143 lb 15.4 oz), SpO2 100 %.  Constitutional: Appears well-developed.   HENT: Normocephalic and atraumatic. No scleral icterus.   Neck: No JVD present.   Cardiovascular: Normal rate. Exam reveals no gallop and no friction rub. 1-2/6 ESM at aortic area.    Pulmonary/Chest: Minimal rales at both lung bases   Abdominal: S/NT/ND BS+   Musculoskeletal: Exhibits no edema. Pulses present.  Skin: Skin is warm and dry.       Fluids:    Date 02/25/18 0700 - 02/26/18 0659   Shift [id number] 0702-2903 [id number] " 24 Hour Total   I  N  T  A  K  E   Shift Total       O  U  T  P  U  T   Urine 230   230      Void (ml) 230   230    Shift Total 230   230   NET -230   -230        Intake/Output Summary (Last 24 hours) at 02/25/18 1108  Last data filed at 02/25/18 0900   Gross per 24 hour   Intake             1420 ml   Output              995 ml   Net              425 ml       Weight: 65.3 kg (143 lb 15.4 oz)  Body mass index is 22.55 kg/m².    Recent Labs      02/23/18 0319 02/24/18   0313  02/25/18   0240   SODIUM  138  134*  133*   POTASSIUM  4.2  4.3  4.3   CHLORIDE  101  97  99   CO2  31  29  27   BUN  27*  30*  31*   CREATININE  1.09  1.41*  1.17   CALCIUM  8.2*  8.7  8.8         Recent Labs      02/23/18 0319 02/24/18   0313  02/25/18   0240   WBC  10.4  10.8  9.4   RBC  4.50*  4.42*  4.18*   HEMOGLOBIN  12.6*  12.5*  11.7*   HEMATOCRIT  39.8*  39.6*  37.1*   MCV  88.4  89.6  88.8   MCH  28.0  28.3  28.0   MCHC  31.7*  31.6*  31.5*   RDW  53.5*  54.6*  52.5*   PLATELETCT  274  276  255   MPV  10.6  10.1  10.3     Recent Labs      02/23/18 0319 02/24/18   0313  02/25/18   0240   SODIUM  138  134*  133*   POTASSIUM  4.2  4.3  4.3   CHLORIDE  101  97  99   CO2  31  29  27   GLUCOSE  98  98  95   BUN  27*  30*  31*   CREATININE  1.09  1.41*  1.17   CALCIUM  8.2*  8.7  8.8     Recent Labs      02/22/18   1650   INR  1.06     Recent Labs      02/23/18 0319 02/24/18   0313  02/25/18   0240   BNPBTYPENAT  1492*  847*  634*     Recent Labs      02/23/18   0319 02/24/18   0313  02/25/18   0240   BNPBTYPENAT  1492*  847*  634*           Meds:  • lisinopril  2.5 mg     • spironolactone  12.5 mg     • furosemide  20 mg     • carvedilol  6.25 mg     • levothyroxine  150 mcg     • ondansetron  4 mg     • dexamethasone  4 mg     • diphenhydrAMINE  25 mg     • haloperidol lactate  1 mg     • scopolamine  1 Patch     • acetaminophen  650 mg     • atorvastatin  80 mg     • oxyCODONE immediate-release  5 mg      Or   • oxyCODONE  immediate-release  10 mg     • aspirin  81 mg     • budesonide-formoterol  2 Puff     • tiotropium  1 Cap     • magnesium oxide  400 mg     • calcium carbonate  1,000 mg     • senna-docusate  2 Tab      And   • polyethylene glycol/lytes  1 Packet      And   • magnesium hydroxide  30 mL      And   • bisacodyl  10 mg     • Respiratory Care per Protocol       • enoxaparin  40 mg     • acetaminophen  650 mg     • ampicillin-sulbactam (UNASYN) IV  3 g Stopped (02/25/18 0631)   • ondansetron  4 mg     • ondansetron  4 mg     • nicotine  14 mg      And   • nicotine polacrilex  2 mg     • folic acid  5 mg     • omeprazole  20 mg     • tamsulosin  0.4 mg     • ipratropium-albuterol  3 mL         Current Facility-Administered Medications   Medication Dose Frequency Provider Last Rate Last Dose   • lisinopril (PRINIVIL) tablet 2.5 mg  2.5 mg Q DAY Larry Mittal M.D.   2.5 mg at 02/25/18 0904   • spironolactone (ALDACTONE) tablet 12.5 mg  12.5 mg Q DAY Larry Mittal M.D.   12.5 mg at 02/25/18 0907   • furosemide (LASIX) tablet 20 mg  20 mg Q DAY Ana Diggs M.D.   20 mg at 02/25/18 0912   • carvedilol (COREG) tablet 6.25 mg  6.25 mg BID WITH MEALS August Juarez M.D.   6.25 mg at 02/25/18 0903   • levothyroxine (SYNTHROID) injection 150 mcg  150 mcg DAILY Larry Mittal M.D.   150 mcg at 02/25/18 0909   • ondansetron (ZOFRAN) syringe/vial injection 4 mg  4 mg Q4HRS PRN Do Garces M.D.       • dexamethasone (DECADRON) injection 4 mg  4 mg Once PRN Do Garces M.D.       • diphenhydrAMINE (BENADRYL) injection 25 mg  25 mg Q6HRS PRN Do Garces M.D.       • haloperidol lactate (HALDOL) injection 1 mg  1 mg Q6HRS PRN Do Garces, M.D.       • scopolamine (TRANSDERM-SCOP) patch 1 Patch  1 Patch Q72HRS PRLUIS FELIPE Garces M.D.       • acetaminophen (TYLENOL) tablet 650 mg  650 mg Q6HRS OC Garces M.D.       • atorvastatin (LIPITOR) tablet 80 mg  80 mg QHS  Yojana Frias M.D.   80 mg at 02/24/18 2138   • oxyCODONE immediate-release (ROXICODONE) tablet 5 mg  5 mg Q4HRS PRN Larry Mittal M.D.   5 mg at 02/23/18 1814    Or   • oxyCODONE immediate release (ROXICODONE) tablet 10 mg  10 mg Q4HRS PRN Larry Mittal M.D.   10 mg at 02/24/18 2138   • aspirin (ASA) chewable tab 81 mg  81 mg DAILY Willi Guerrero M.D.   81 mg at 02/25/18 0903   • budesonide-formoterol (SYMBICORT) 80-4.5 MCG/ACT inhaler 2 Puff  2 Puff BID Willi Guerrero M.D.   2 Puff at 02/25/18 0903   • tiotropium (SPIRIVA) 18 MCG inhalation capsule 1 Cap  1 Cap DAILY Willi Guerrero M.D.   1 Cap at 02/24/18 0904   • magnesium oxide (MAG-OX) tablet 400 mg  400 mg BID Larry Mittal M.D.   400 mg at 02/25/18 0907   • calcium carbonate (TUMS) chewable tab 1,000 mg  1,000 mg TID PRN Wen Stokes M.D.   1,000 mg at 02/21/18 0005   • senna-docusate (PERICOLACE or SENOKOT S) 8.6-50 MG per tablet 2 Tab  2 Tab BID Willi Guerrero M.D.   2 Tab at 02/22/18 2004    And   • polyethylene glycol/lytes (MIRALAX) PACKET 1 Packet  1 Packet QDAY PRN Willi Guerrero M.D.        And   • magnesium hydroxide (MILK OF MAGNESIA) suspension 30 mL  30 mL QDAY PRN Willi Guerrero M.D.        And   • bisacodyl (DULCOLAX) suppository 10 mg  10 mg QDAY PRN Willi Guerrero M.D.       • Respiratory Care per Protocol   Continuous RT Willi Guerrero M.D.       • enoxaparin (LOVENOX) inj 40 mg  40 mg DAILY Willi Guerrero M.D.   40 mg at 02/24/18 1830   • acetaminophen (TYLENOL) tablet 650 mg  650 mg Q6HRS PRN Willi Guerrero M.D.   650 mg at 02/23/18 2047   • ampicillin/sulbactam (UNASYN) 3 g in  mL IVPB  3 g Q6HRS Willi Guerrero M.D.   Stopped at 02/25/18 0631   • ondansetron (ZOFRAN) syringe/vial injection 4 mg  4 mg Q4HRS PRN Willi Guerrero M.D.       • ondansetron (ZOFRAN ODT) dispertab 4 mg  4 mg Q4HRS PRN Willi Guerrero M.D.       • nicotine (NICODERM) 14 MG/24HR 14 mg  14 mg Daily-0600 Willi Guerrero M.D.   14 mg at 02/25/18 0605    And   •  nicotine polacrilex (NICORETTE) 2 MG piece 2 mg  2 mg Q HOUR PRN Willi Guerrero M.D.       • folic acid (FOLVITE) tablet 5 mg  5 mg DAILY Willi Guerrero M.D.   5 mg at 02/25/18 0904   • omeprazole (PRILOSEC) capsule 20 mg  20 mg BID AC Willi Guerrero M.D.   20 mg at 02/25/18 0606   • tamsulosin (FLOMAX) capsule 0.4 mg  0.4 mg QHS Willi Guerrero M.D.   0.4 mg at 02/24/18 2138   • ipratropium-albuterol (DUONEB) nebulizer solution 3 mL  3 mL Q4H PRN (RT) Willi Guerrero M.D.         Last reviewed on 2/19/2018  4:15 PM by Promise Lucio PhT  Medications reviewed      Imaging reviewed    ECHO(02/24/18):  CONCLUSIONS  Known mitral valve bioprosthesis which is functioning normally with   appropriate transvalvular gradient.    Severely reduced left ventricular systolic function.  Left ventricular ejection fraction is visually estimated to be 20%.    Impressions and Recommendations:    1. HFrEF (LVEF 20%)   - Combination of Non-ischemic and Ischemic Cardiomyopathy given meth use, valvular pathology and prior CAD.      - S/p LHC 2/24, with no Obstructive CAD. LVEF 20%. NYHA FC I-II.   - Doing better with diuresis, Negative Bal 2.6L.   - ASA/Atorva 80, Lisinopril 2.5, Coreg 6.25, Aldactone 12.5, PO lasix 20mg.    - Uptitrate ACEI and BP as tolerated by HR and BP.    - Monitor Electrolytes.    - Counseled regarding Smoking and drugs complete cessation, Low salt/cardiac diet and Exercise.   - In out patient setting, he might benefit from starting Entresto. To be discussed in his heart failure clinic on out patient basis.     2. MVR s/p bioprosthetic valve   -  Continue current active management for heart failure.     3.Methamphetamine use   -  Counseled extensively regarding cessation.    4. ANA ROSA   - Likely pre-renal, Cardiorenal Type I.    - Resolving. Continue diuresis.    - Monitor kidney function and electrolytes.    5.Sepsis   6. Tobacco Abuse   7. Hypothyroidism  8. COPD  9. Transaminases -KATY no endocarditis      Thank you for your  consult.   Will follow the patient with you.

## 2018-02-25 NOTE — CARE PLAN
Problem: Safety  Goal: Will remain free from injury  Outcome: PROGRESSING AS EXPECTED  Reassess gait post procedure, education on getting up slowly, assessed pt while ambulating.  Goal: Will remain free from falls  Outcome: PROGRESSING AS EXPECTED  Patient educated on calling for assistance before getting out of bed, bed alarm placed on after patients procedure, glasses placed within patient's reach to aid with vision. Bed rail closest to bathroom down.

## 2018-02-25 NOTE — PROGRESS NOTES
[facility] Hospitalist Progress Note    Date of Service: 2018    Chief Complaint  57 y.o. male admitted 2018 with Sepsis, Pneumonia, Respiratory failure, CHF exacerbation.    Interval Problem Update  PNA - feels much better  Resp failure - back to baseline O2 2 lpm  CHF - EF 20%, baseline?, cardiac cath and KATY done  LFTs - trending down    Consultants/Specialty  Cardiology - [facility]    Disposition  TBD        Review of Systems   Constitutional: Positive for malaise/fatigue. Negative for chills and fever.   HENT: Negative for hearing loss and sore throat.    Eyes: Negative for blurred vision.   Respiratory: Negative for cough, shortness of breath and wheezing.    Cardiovascular: Negative for chest pain and leg swelling.   Gastrointestinal: Negative for abdominal pain, diarrhea, heartburn, nausea and vomiting.   Genitourinary: Negative for dysuria.   Neurological: Positive for weakness. Negative for dizziness.      Physical Exam  Laboratory/Imaging   Hemodynamics  Temp (24hrs), Av.2 °C (97.1 °F), Min:36 °C (96.8 °F), Max:36.4 °C (97.5 °F)   Temperature: 36.4 °C (97.5 °F)  Pulse  Av  Min: 62  Max: 116   Blood Pressure: 119/87      Respiratory      Respiration: 16, Pulse Oximetry: 99 %        RUL Breath Sounds: Diminished, RML Breath Sounds: Diminished, RLL Breath Sounds: Diminished, EZE Breath Sounds: Diminished;Crackles, LLL Breath Sounds: Diminished    Fluids    Intake/Output Summary (Last 24 hours) at 18 1620  Last data filed at 18 1600   Gross per 24 hour   Intake              590 ml   Output             1210 ml   Net             -620 ml       Nutrition  Orders Placed This Encounter   Procedures   • Diet Order     Standing Status:   Standing     Number of Occurrences:   1     Order Specific Question:   Diet:     Answer:   Cardiac [6]     Physical Exam   Constitutional: He is oriented to person, place, and time. He appears well-developed and well-nourished.   HENT:   Head: Normocephalic  and atraumatic.   Eyes: Conjunctivae are normal. Pupils are equal, round, and reactive to light.   Neck: No tracheal deviation present. No thyromegaly present.   Cardiovascular: Normal rate and regular rhythm.    Pulmonary/Chest: Effort normal. He has decreased breath sounds in the right lower field and the left lower field. He has rales.   Abdominal: Soft. Bowel sounds are normal. He exhibits no distension. There is no tenderness. There is no rebound and no guarding.   Musculoskeletal: He exhibits no edema.   Lymphadenopathy:     He has no cervical adenopathy.   Neurological: He is alert and oriented to person, place, and time.   Skin: Skin is warm and dry.   Nursing note and vitals reviewed.      Recent Labs      02/22/18   1650 02/23/18 0319 02/24/18 0313   WBC  12.2*  10.4  10.8   RBC  4.81  4.50*  4.42*   HEMOGLOBIN  13.6*  12.6*  12.5*   HEMATOCRIT  43.1  39.8*  39.6*   MCV  89.6  88.4  89.6   MCH  28.3  28.0  28.3   MCHC  31.6*  31.7*  31.6*   RDW  54.4*  53.5*  54.6*   PLATELETCT  294  274  276   MPV  10.2  10.6  10.1     Recent Labs      02/22/18   1650 02/23/18 0319 02/24/18   0313   SODIUM  137  138  134*   POTASSIUM  4.1  4.2  4.3   CHLORIDE  97  101  97   CO2  32  31  29   GLUCOSE  107*  98  98   BUN  26*  27*  30*   CREATININE  1.26  1.09  1.41*   CALCIUM  9.0  8.2*  8.7     Recent Labs      02/22/18   1650   INR  1.06     Recent Labs      02/22/18   0257  02/23/18   0319  02/24/18   0313   BNPBTYPENAT  1509*  1492*  847*              Assessment/Plan     * CAP (community acquired pneumonia)- (present on admission)   Assessment & Plan    Finished course of Azithromycin  IV Unasyn          Acute and chronic respiratory failure (acute-on-chronic) (CMS-HCC)- (present on admission)   Assessment & Plan    Keep O2 sats above 90%        Sepsis (CMS-HCC)- (present on admission)   Assessment & Plan    This is sepsis (without associated acute organ dysfunction).   Source is pneumonia          CHF, acute  on chronic (CMS-HCC)- (present on admission)   Assessment & Plan    Acute on Chronic Systolic CHF  Coreg, start Lisinopril, Aldactone          CAD (coronary artery disease)- (present on admission)   Assessment & Plan    ASA, Lipitor  50% stenosis in mid left anterior descending artery, but no flow limiting disease.        S/P MVR (mitral valve replacement)- (present on admission)   Assessment & Plan    stable        Hypomagnesemia- (present on admission)   Assessment & Plan    oral Mg        Renal insufficiency   Assessment & Plan    Follow bmp        Pleural effusion- (present on admission)   Assessment & Plan    stable        Hypothyroid- (present on admission)   Assessment & Plan    Increased IV Synthroid to 150 mcg        COPD (chronic obstructive pulmonary disease) (CMS-HCC)- (present on admission)   Assessment & Plan    Spiriva, Symbicort, RT protocol        Elevated troponin- (present on admission)   Assessment & Plan    ASA        Transaminitis- (present on admission)   Assessment & Plan    Likely secondary to hepatic congestion  Follow cmp        Tobacco abuse- (present on admission)   Assessment & Plan    Nicotine replacement             Quality-Core Measures   Reviewed items::  EKG reviewed, Radiology images reviewed, Labs reviewed and Medications reviewed  Villareal catheter::  Urinary Tract Retention or Urinary Tract Obstruction  DVT prophylaxis pharmacological::  Enoxaparin (Lovenox)  Ulcer Prophylaxis::  Yes  Antibiotics:  Treating active infection/contamination beyond 24 hours perioperative coverage

## 2018-02-25 NOTE — PROGRESS NOTES
[facility] Hospitalist Progress Note    Date of Service: 2018    Chief Complaint  57 y.o. male admitted 2018 with Sepsis, Pneumonia, Respiratory failure, CHF exacerbation.    Interval Problem Update  PNA - feels much better  Resp failure - back to baseline O2 2 lpm  CHF - EF 20%, baseline?, cardiac cath and KATY done, BP on low side  LFTs - trending down    Consultants/Specialty  Cardiology - [facility]    Disposition  TBD        Review of Systems   Constitutional: Positive for malaise/fatigue. Negative for chills and fever.   HENT: Negative for hearing loss and sore throat.    Eyes: Negative for blurred vision.   Respiratory: Negative for cough, shortness of breath and wheezing.    Cardiovascular: Negative for chest pain and leg swelling.   Gastrointestinal: Negative for abdominal pain, diarrhea, heartburn, nausea and vomiting.   Genitourinary: Negative for dysuria.   Neurological: Positive for weakness. Negative for dizziness.      Physical Exam  Laboratory/Imaging   Hemodynamics  Temp (24hrs), Av.3 °C (97.4 °F), Min:36 °C (96.8 °F), Max:36.5 °C (97.7 °F)   Temperature: 36.4 °C (97.5 °F)  Pulse  Av.5  Min: 62  Max: 116   Blood Pressure: (!) 95/74 (Rn notified)      Respiratory      Respiration: 16, Pulse Oximetry: 100 %        RUL Breath Sounds: Rhonchi, RML Breath Sounds: Diminished, RLL Breath Sounds: Diminished;Crackles, EZE Breath Sounds: Rhonchi, LLL Breath Sounds: Diminished;Crackles    Fluids    Intake/Output Summary (Last 24 hours) at 18 1449  Last data filed at 18 1200   Gross per 24 hour   Intake             1180 ml   Output             1185 ml   Net               -5 ml       Nutrition  Orders Placed This Encounter   Procedures   • Diet Order     Standing Status:   Standing     Number of Occurrences:   1     Order Specific Question:   Diet:     Answer:   Cardiac [6]     Physical Exam   Constitutional: He is oriented to person, place, and time. He appears well-developed and  well-nourished.   HENT:   Head: Normocephalic and atraumatic.   Eyes: Conjunctivae are normal. Pupils are equal, round, and reactive to light.   Neck: No tracheal deviation present. No thyromegaly present.   Cardiovascular: Normal rate and regular rhythm.    Pulmonary/Chest: Effort normal. He has decreased breath sounds in the right lower field and the left lower field. He has rales.   Abdominal: Soft. Bowel sounds are normal. He exhibits no distension. There is no tenderness. There is no rebound and no guarding.   Musculoskeletal: He exhibits no edema.   Lymphadenopathy:     He has no cervical adenopathy.   Neurological: He is alert and oriented to person, place, and time.   Skin: Skin is warm and dry.   Nursing note and vitals reviewed.      Recent Labs      02/23/18 0319 02/24/18 0313 02/25/18   0240   WBC  10.4  10.8  9.4   RBC  4.50*  4.42*  4.18*   HEMOGLOBIN  12.6*  12.5*  11.7*   HEMATOCRIT  39.8*  39.6*  37.1*   MCV  88.4  89.6  88.8   MCH  28.0  28.3  28.0   MCHC  31.7*  31.6*  31.5*   RDW  53.5*  54.6*  52.5*   PLATELETCT  274  276  255   MPV  10.6  10.1  10.3     Recent Labs      02/23/18 0319 02/24/18   0313  02/25/18   0240   SODIUM  138  134*  133*   POTASSIUM  4.2  4.3  4.3   CHLORIDE  101  97  99   CO2  31  29  27   GLUCOSE  98  98  95   BUN  27*  30*  31*   CREATININE  1.09  1.41*  1.17   CALCIUM  8.2*  8.7  8.8     Recent Labs      02/22/18   1650   INR  1.06     Recent Labs      02/23/18   0319 02/24/18   0313  02/25/18   0240   BNPBTYPENAT  1492*  847*  634*              Assessment/Plan     * CAP (community acquired pneumonia)- (present on admission)   Assessment & Plan    Finished course of Azithromycin  IV Unasyn          Acute and chronic respiratory failure (acute-on-chronic) (CMS-HCC)- (present on admission)   Assessment & Plan    Keep O2 sats above 90%        Sepsis (CMS-[facility])- (present on admission)   Assessment & Plan    This is sepsis (without associated acute organ  dysfunction).   Source is pneumonia          CHF, acute on chronic (CMS-HCC)- (present on admission)   Assessment & Plan    Acute on Chronic Systolic CHF  Coreg, decreas Lisinopril and Aldactone dose  Stop Lasix          CAD (coronary artery disease)- (present on admission)   Assessment & Plan    ASA, Lipitor  50% stenosis in mid left anterior descending artery, but no flow limiting disease.        S/P MVR (mitral valve replacement)- (present on admission)   Assessment & Plan    stable        Hypomagnesemia- (present on admission)   Assessment & Plan    oral Mg        Renal insufficiency   Assessment & Plan    Follow bmp        Pleural effusion- (present on admission)   Assessment & Plan    stable        Hypothyroid- (present on admission)   Assessment & Plan    Increased IV Synthroid to 150 mcg        COPD (chronic obstructive pulmonary disease) (CMS-HCC)- (present on admission)   Assessment & Plan    Spiriva, Symbicort, RT protocol        Elevated troponin- (present on admission)   Assessment & Plan    ASA        Transaminitis- (present on admission)   Assessment & Plan    Likely secondary to hepatic congestion  Follow cmp        Tobacco abuse- (present on admission)   Assessment & Plan    Nicotine replacement             Quality-Core Measures   Reviewed items::  EKG reviewed, Radiology images reviewed, Labs reviewed and Medications reviewed  Villareal catheter::  Urinary Tract Retention or Urinary Tract Obstruction  DVT prophylaxis pharmacological::  Enoxaparin (Lovenox)  Ulcer Prophylaxis::  Yes  Antibiotics:  Treating active infection/contamination beyond 24 hours perioperative coverage

## 2018-02-26 VITALS
TEMPERATURE: 97.8 F | HEIGHT: 67 IN | RESPIRATION RATE: 18 BRPM | OXYGEN SATURATION: 100 % | WEIGHT: 153.22 LBS | DIASTOLIC BLOOD PRESSURE: 74 MMHG | HEART RATE: 95 BPM | SYSTOLIC BLOOD PRESSURE: 112 MMHG | BODY MASS INDEX: 24.05 KG/M2

## 2018-02-26 LAB
ALBUMIN SERPL BCP-MCNC: 3.5 G/DL (ref 3.2–4.9)
ALBUMIN/GLOB SERPL: 1.3 G/DL
ALP SERPL-CCNC: 126 U/L (ref 30–99)
ALT SERPL-CCNC: 78 U/L (ref 2–50)
ANION GAP SERPL CALC-SCNC: 7 MMOL/L (ref 0–11.9)
AST SERPL-CCNC: 23 U/L (ref 12–45)
BASOPHILS # BLD AUTO: 1 % (ref 0–1.8)
BASOPHILS # BLD: 0.09 K/UL (ref 0–0.12)
BILIRUB SERPL-MCNC: 0.5 MG/DL (ref 0.1–1.5)
BNP SERPL-MCNC: 553 PG/ML (ref 0–100)
BUN SERPL-MCNC: 33 MG/DL (ref 8–22)
CALCIUM SERPL-MCNC: 8.5 MG/DL (ref 8.5–10.5)
CHLORIDE SERPL-SCNC: 101 MMOL/L (ref 96–112)
CO2 SERPL-SCNC: 27 MMOL/L (ref 20–33)
CREAT SERPL-MCNC: 1.07 MG/DL (ref 0.5–1.4)
EKG IMPRESSION: NORMAL
EOSINOPHIL # BLD AUTO: 0.88 K/UL (ref 0–0.51)
EOSINOPHIL NFR BLD: 9.3 % (ref 0–6.9)
ERYTHROCYTE [DISTWIDTH] IN BLOOD BY AUTOMATED COUNT: 51.2 FL (ref 35.9–50)
GLOBULIN SER CALC-MCNC: 2.7 G/DL (ref 1.9–3.5)
GLUCOSE SERPL-MCNC: 95 MG/DL (ref 65–99)
HCT VFR BLD AUTO: 35.9 % (ref 42–52)
HGB BLD-MCNC: 11.6 G/DL (ref 14–18)
IMM GRANULOCYTES # BLD AUTO: 0.05 K/UL (ref 0–0.11)
IMM GRANULOCYTES NFR BLD AUTO: 0.5 % (ref 0–0.9)
LYMPHOCYTES # BLD AUTO: 1.66 K/UL (ref 1–4.8)
LYMPHOCYTES NFR BLD: 17.5 % (ref 22–41)
MCH RBC QN AUTO: 28.4 PG (ref 27–33)
MCHC RBC AUTO-ENTMCNC: 32.3 G/DL (ref 33.7–35.3)
MCV RBC AUTO: 88 FL (ref 81.4–97.8)
MONOCYTES # BLD AUTO: 0.84 K/UL (ref 0–0.85)
MONOCYTES NFR BLD AUTO: 8.9 % (ref 0–13.4)
NEUTROPHILS # BLD AUTO: 5.94 K/UL (ref 1.82–7.42)
NEUTROPHILS NFR BLD: 62.8 % (ref 44–72)
NRBC # BLD AUTO: 0 K/UL
NRBC BLD-RTO: 0 /100 WBC
PLATELET # BLD AUTO: 252 K/UL (ref 164–446)
PMV BLD AUTO: 10.8 FL (ref 9–12.9)
POTASSIUM SERPL-SCNC: 4.5 MMOL/L (ref 3.6–5.5)
PROT SERPL-MCNC: 6.2 G/DL (ref 6–8.2)
RBC # BLD AUTO: 4.08 M/UL (ref 4.7–6.1)
SODIUM SERPL-SCNC: 135 MMOL/L (ref 135–145)
WBC # BLD AUTO: 9.5 K/UL (ref 4.8–10.8)

## 2018-02-26 PROCEDURE — A9270 NON-COVERED ITEM OR SERVICE: HCPCS | Performed by: STUDENT IN AN ORGANIZED HEALTH CARE EDUCATION/TRAINING PROGRAM

## 2018-02-26 PROCEDURE — 80053 COMPREHEN METABOLIC PANEL: CPT

## 2018-02-26 PROCEDURE — 85025 COMPLETE CBC W/AUTO DIFF WBC: CPT

## 2018-02-26 PROCEDURE — 700102 HCHG RX REV CODE 250 W/ 637 OVERRIDE(OP): Performed by: INTERNAL MEDICINE

## 2018-02-26 PROCEDURE — 83880 ASSAY OF NATRIURETIC PEPTIDE: CPT

## 2018-02-26 PROCEDURE — 99239 HOSP IP/OBS DSCHRG MGMT >30: CPT | Performed by: FAMILY MEDICINE

## 2018-02-26 PROCEDURE — 36415 COLL VENOUS BLD VENIPUNCTURE: CPT

## 2018-02-26 PROCEDURE — 700102 HCHG RX REV CODE 250 W/ 637 OVERRIDE(OP): Performed by: STUDENT IN AN ORGANIZED HEALTH CARE EDUCATION/TRAINING PROGRAM

## 2018-02-26 PROCEDURE — 700102 HCHG RX REV CODE 250 W/ 637 OVERRIDE(OP): Performed by: FAMILY MEDICINE

## 2018-02-26 PROCEDURE — A9270 NON-COVERED ITEM OR SERVICE: HCPCS | Performed by: INTERNAL MEDICINE

## 2018-02-26 PROCEDURE — A9270 NON-COVERED ITEM OR SERVICE: HCPCS | Performed by: FAMILY MEDICINE

## 2018-02-26 PROCEDURE — 700101 HCHG RX REV CODE 250: Performed by: FAMILY MEDICINE

## 2018-02-26 RX ORDER — ATORVASTATIN CALCIUM 80 MG/1
80 TABLET, FILM COATED ORAL
Qty: 30 TAB | Refills: 1 | Status: SHIPPED | OUTPATIENT
Start: 2018-02-26

## 2018-02-26 RX ORDER — LEVOTHYROXINE SODIUM 300 UG/1
300 TABLET ORAL
Qty: 30 TAB | Refills: 1 | Status: SHIPPED | OUTPATIENT
Start: 2018-02-26

## 2018-02-26 RX ORDER — ASPIRIN 81 MG/1
81 TABLET, CHEWABLE ORAL DAILY
Qty: 100 TAB | Refills: 0 | Status: SHIPPED | OUTPATIENT
Start: 2018-02-27 | End: 2022-11-26

## 2018-02-26 RX ORDER — SPIRONOLACTONE 25 MG/1
12.5 TABLET ORAL DAILY
Qty: 30 TAB | Refills: 1 | Status: SHIPPED | OUTPATIENT
Start: 2018-02-27 | End: 2022-11-26

## 2018-02-26 RX ORDER — CARVEDILOL 6.25 MG/1
6.25 TABLET ORAL 2 TIMES DAILY WITH MEALS
Qty: 60 TAB | Refills: 1 | Status: SHIPPED | OUTPATIENT
Start: 2018-02-26

## 2018-02-26 RX ORDER — LISINOPRIL 2.5 MG/1
2.5 TABLET ORAL DAILY
Qty: 30 TAB | Refills: 1 | Status: SHIPPED | OUTPATIENT
Start: 2018-02-27

## 2018-02-26 RX ADMIN — FOLIC ACID 5 MG: 1 TABLET ORAL at 07:55

## 2018-02-26 RX ADMIN — TIOTROPIUM BROMIDE 1 CAPSULE: 18 CAPSULE ORAL; RESPIRATORY (INHALATION) at 07:54

## 2018-02-26 RX ADMIN — OMEPRAZOLE 20 MG: 20 CAPSULE, DELAYED RELEASE ORAL at 06:48

## 2018-02-26 RX ADMIN — LEVOTHYROXINE SODIUM ANHYDROUS 150 MCG: 100 INJECTION, POWDER, LYOPHILIZED, FOR SOLUTION INTRAVENOUS at 08:56

## 2018-02-26 RX ADMIN — CARVEDILOL 6.25 MG: 6.25 TABLET, FILM COATED ORAL at 16:46

## 2018-02-26 RX ADMIN — OMEPRAZOLE 20 MG: 20 CAPSULE, DELAYED RELEASE ORAL at 16:46

## 2018-02-26 RX ADMIN — ASPIRIN 81 MG: 81 TABLET, CHEWABLE ORAL at 07:55

## 2018-02-26 RX ADMIN — CARVEDILOL 6.25 MG: 6.25 TABLET, FILM COATED ORAL at 07:54

## 2018-02-26 RX ADMIN — MAGNESIUM GLUCONATE 500 MG ORAL TABLET 400 MG: 500 TABLET ORAL at 07:55

## 2018-02-26 RX ADMIN — SPIRONOLACTONE 12.5 MG: 25 TABLET, FILM COATED ORAL at 07:55

## 2018-02-26 RX ADMIN — NICOTINE 14 MG: 14 PATCH, EXTENDED RELEASE TRANSDERMAL at 06:47

## 2018-02-26 RX ADMIN — LISINOPRIL 2.5 MG: 5 TABLET ORAL at 07:55

## 2018-02-26 RX ADMIN — BUDESONIDE AND FORMOTEROL FUMARATE DIHYDRATE 2 PUFF: 80; 4.5 AEROSOL RESPIRATORY (INHALATION) at 07:54

## 2018-02-26 ASSESSMENT — ENCOUNTER SYMPTOMS
NAUSEA: 0
SEIZURES: 0
SHORTNESS OF BREATH: 0
MEMORY LOSS: 1
LOSS OF CONSCIOUSNESS: 0
MYALGIAS: 0
COUGH: 0
PALPITATIONS: 0
FEVER: 0
PHOTOPHOBIA: 0
STRIDOR: 0
ABDOMINAL PAIN: 0
PND: 0
NERVOUS/ANXIOUS: 0
ORTHOPNEA: 1
DEPRESSION: 0
EYE PAIN: 0
WHEEZING: 0
CHILLS: 0
HEADACHES: 0
FLANK PAIN: 0
VOMITING: 0

## 2018-02-26 ASSESSMENT — LIFESTYLE VARIABLES: SUBSTANCE_ABUSE: 1

## 2018-02-26 ASSESSMENT — PAIN SCALES - GENERAL: PAINLEVEL_OUTOF10: 3

## 2018-02-26 NOTE — PROGRESS NOTES
DISCHARGE:    Patient discharged to home with family, patient declined hospital escort, ambulated with family to personal vehicle. Alert and oriented at time of discharge. Home oxygen tanks x2 for transport home. Patient previously on home oxygen. IV removed, telemetry box removed and returned to monitor room. Discharge instructions reviewed, all questions answered. Follow-up appointment scheduled for 2/28/18. Patient states having all personal belongings at time of discharge. Prescriptions sent electronically to preferred pharmacy, patient states ability to obtain.

## 2018-02-26 NOTE — PROGRESS NOTES
Bedside report completed, assumed care of patient. Oxygen via nasal cannula at patients home rate of 3 L. Call light within reach.

## 2018-02-26 NOTE — CARE PLAN
Problem: Venous Thromboembolism (VTW)/Deep Vein Thrombosis (DVT) Prevention:  Goal: Patient will participate in Venous Thrombosis (VTE)/Deep Vein Thrombosis (DVT)Prevention Measures    Intervention: Encourage ambulation/mobilization at level directed by Physical Therapy in collaboration with Interdisciplinary Team  Pt ambulated around telemetry unit, around 500 ft once during day shift. Pt tolerated the activity well. He did not feel any shortness of breath or fatigue.       Problem: Knowledge Deficit  Goal: Knowledge of the prescribed therapeutic regimen will improve  Pt is able to recognize why he is receiving current medication as they relate to his disease process. He has been taking notes related to modifications in lifestyle as they relate to his disease process.

## 2018-02-26 NOTE — DISCHARGE INSTRUCTIONS
Discharge Instructions    Discharged to home by car with relative. Discharged via wheelchair, hospital escort: Yes.  Special equipment needed: Not Applicable    Be sure to schedule a follow-up appointment with your primary care doctor or any specialists as instructed.     Discharge Plan:   Influenza Vaccine Indication: Not indicated: Previously immunized this influenza season and > 8 years of age  Influenza Vaccine Given - only chart on this line when given: Influenza Vaccine Given (See MAR)    I understand that a diet low in cholesterol, fat, and sodium is recommended for good health. Unless I have been given specific instructions below for another diet, I accept this instruction as my diet prescription.   Other diet: cardiac diet    Special Instructions:   HF Patient Discharge Instructions  · Monitor your weight daily, and maintain a weight chart, to track your weight changes.   · Activity as tolerated, unless your Doctor has ordered otherwise. Other activity order: activity as tolerated.  · Follow a low fat, low cholesterol, low salt diet unless instructed otherwise by your Doctor. Read the labels on the back of food products and track your intake of fat, cholesterol and salt.   · Fluid Restriction No. If a Fluid Restriction has been ordered by your Doctor, measure fluids with a measuring cup to ensure that you are not exceeding the restriction.   · No smoking.  · Oxygen No. If your Doctor has ordered that you wear Oxygen at home, it is important to wear it as ordered.  · Did you receive an explanation from staff on the importance of taking each of your medications and why it is necessary to keep taking them unless your doctor says to stop? Yes  · Were all of your questions answered about how to manage your heart failure and what to do if you have increased signs and symptoms after you go home? Yes  · Do you feel like your heart failure care team involved you in the care treatment plan and allowed you to make  decisions regarding your care while in the hospital and addressed any discharge needs you might have? Yes    See the educational handout provided at discharge for more information on monitoring your daily weight, activity and diet. This also explains more about Heart Failure, symptoms of a flare-up and some of the tests that you have undergone.     Warning Signs of a Flare-Up include:  · Swelling in the ankles or lower legs.  · Shortness of breath, while at rest, or while doing normal activities.   · Shortness of breath at night when in bed, or coughing in bed.   · Requiring more pillows to sleep at night, or needing to sit up at night to sleep.  · Feeling weak, dizzy or fatigued.     When to call your Doctor:  · Call [facility] seven days a week from 8:00 a.m. to 8:00 p.m. for medical questions [phone].  · Call your Primary Care Physician or Cardiologist if:   1. You experience any pain radiating to your jaw or neck.  2. You have any difficulty breathing.  3. You experience weight gain of 3 lbs in a day or 5 lbs in a week.   4. You feel any palpitations or irregular heartbeats.  5. You become dizzy or lose consciousness.   If you have had an angiogram or had a pacemaker or AICD placed, and experience:  1. Bleeding, drainage or swelling at the surgical / puncture site.  2. Fever greater than 100.0 F  3. Shock from internal defibrillator.  4. Cool and / or numb extremities.      · Is patient discharged on Warfarin / Coumadin?   No     Depression / Suicide Risk    As you are discharged from this [facility], it is important to learn how to keep safe from harming yourself.    Recognize the warning signs:  · Abrupt changes in personality, positive or negative- including increase in energy   · Giving away possessions  · Change in eating patterns- significant weight changes-  positive or negative  · Change in sleeping patterns- unable to sleep or sleeping all the time   · Unwillingness or  inability to communicate  · Depression  · Unusual sadness, discouragement and loneliness  · Talk of wanting to die  · Neglect of personal appearance   · Rebelliousness- reckless behavior  · Withdrawal from people/activities they love  · Confusion- inability to concentrate     If you or a loved one observes any of these behaviors or has concerns about self-harm, here's what you can do:  · Talk about it- your feelings and reasons for harming yourself  · Remove any means that you might use to hurt yourself (examples: pills, rope, extension cords, firearm)  · Get professional help from the community (Mental Health, Substance Abuse, psychological counseling)  · Do not be alone:Call your Safe Contact- someone whom you trust who will be there for you.  · Call your local CRISIS HOTLINE 404-2774 or 675-562-7541  · Call your local Children's Mobile Crisis Response Team [place] (130) 013-2361 or www.[employer]  · Call the toll free National Suicide Prevention Hotlines   · National Suicide Prevention Lifeline 179-018-PKVX (9738)  · National Hope Line Network 800-SUICIDE (923-4080)

## 2018-02-26 NOTE — PROGRESS NOTES
Cardiology Progress Note                                      Admit Date: 2/19/2018  Resident(s): Yojana Frias     Date & Time:   2/26/2018   1:13 PM       Patient ID:    Name:             Phillip Ann     YOB: 1960  Age:                 57 y.o.  male   MRN:               [id number]    HPI:    Phillip Ann is a pleasant 58 yo man with PMHx of methamphetamine abuse (reportedly in remission), MI, mitral valve replacement s/p bioprosthetic valve replacement (2017 in [facility]), HTN and tobacco abuse who is admitted for sepsis due to pneumonia. Cardiology is consulted for new diagnosis of CHF with reduced EF 20%.     Interval History:    Adequately diuresed (net -2.8 L since admission). Orthopnea and dyspnea improved significantly. Near baseline in terms of respiratory status. Ambulating well.   S/p LHC with no flow limiting CAD (2/24), doing better with no new complaints.   TSH >90 on thyroid supplementation but denies significant fatigue.   Counseled again regarding smoking and meth abuse.   Agrees to quit meth and tobacco. Agrees to f/u in HF clinic.     Review of Systems   Constitutional: Negative for chills and fever.   HENT: Negative for nosebleeds.    Eyes: Negative for photophobia and pain.   Respiratory: Negative for cough, shortness of breath, wheezing and stridor.    Cardiovascular: Positive for orthopnea ( improved). Negative for chest pain, palpitations, leg swelling and PND.   Gastrointestinal: Negative for abdominal pain, nausea and vomiting.   Genitourinary: Negative for flank pain.   Musculoskeletal: Negative for myalgias.   Skin: Negative for itching and rash.   Neurological: Negative for seizures, loss of consciousness and headaches.   Psychiatric/Behavioral: Positive for memory loss and substance abuse. Negative for depression. The patient is not nervous/anxious.        Physical Exam   Blood pressure 117/83, pulse 84, temperature 36.6 °C (97.8 °F), resp. rate 18, height 1.702 m (5'  "7\"), weight 69.5 kg (153 lb 3.5 oz), SpO2 97 %.    Constitutional: frail, thin man. Appears chronically ill and older than stated age.   HENT: Normocephalic and atraumatic. No scleral icterus.   Neck: No JVD present.   Cardiovascular: Normal rate. Exam reveals no gallop and no friction rub. No murmur heard.   Pulmonary/Chest: CTAB with mild bibasilar rales. No wheezing or rhonchi.   Abdominal: S/NT/ND BS+   Musculoskeletal: Exhibits no edema. Pulses present.  Skin: Skin is warm and dry.           Fluids:    Intake/Output Summary (Last 24 hours) at 02/26/18 1313  Last data filed at 02/26/18 1150   Gross per 24 hour   Intake              720 ml   Output              910 ml   Net             -190 ml       Date 02/26/18 0700 - 02/27/18 0659   Shift 5924-8005 5062-8243 6903-5209 24 Hour Total   I  N  T  A  K  E   P.O. 480   480      P.O. 480   480    Shift Total 480   480   O  U  T  P  U  T   Urine 700   700      Void (ml) 700   700    Shift Total 700   700   NET -220   -220          Weight: 69.5 kg (153 lb 3.5 oz)  Body mass index is 24 kg/m².    Recent Labs      02/24/18 0313 02/25/18   0240  02/26/18 0226   SODIUM  134*  133*  135   POTASSIUM  4.3  4.3  4.5   CHLORIDE  97  99  101   CO2  29  27  27   BUN  30*  31*  33*   CREATININE  1.41*  1.17  1.07   CALCIUM  8.7  8.8  8.5         Recent Labs      02/24/18   0313  02/25/18   0240  02/26/18   0225   WBC  10.8  9.4  9.5   RBC  4.42*  4.18*  4.08*   HEMOGLOBIN  12.5*  11.7*  11.6*   HEMATOCRIT  39.6*  37.1*  35.9*   MCV  89.6  88.8  88.0   MCH  28.3  28.0  28.4   MCHC  31.6*  31.5*  32.3*   RDW  54.6*  52.5*  51.2*   PLATELETCT  276  255  252   MPV  10.1  10.3  10.8     Recent Labs      02/24/18 0313 02/25/18   0240  02/26/18   0226   SODIUM  134*  133*  135   POTASSIUM  4.3  4.3  4.5   CHLORIDE  97  99  101   CO2  29  27  27   GLUCOSE  98  95  95   BUN  30*  31*  33*   CREATININE  1.41*  1.17  1.07   CALCIUM  8.7  8.8  8.5         Recent Labs      02/24/18   " 0313 02/25/18 0240 02/26/18 0225   BNPBTYPENAT  847*  634*  553*     Recent Labs      02/24/18 0313 02/25/18 0240 02/26/18 0225   BNPBTYPENAT  847*  634*  553*           Meds:  • lisinopril  2.5 mg     • spironolactone  12.5 mg     • carvedilol  6.25 mg     • levothyroxine  150 mcg     • ondansetron  4 mg     • dexamethasone  4 mg     • diphenhydrAMINE  25 mg     • haloperidol lactate  1 mg     • scopolamine  1 Patch     • acetaminophen  650 mg     • atorvastatin  80 mg     • oxyCODONE immediate-release  5 mg      Or   • oxyCODONE immediate-release  10 mg     • aspirin  81 mg     • budesonide-formoterol  2 Puff     • tiotropium  1 Cap     • magnesium oxide  400 mg     • calcium carbonate  1,000 mg     • senna-docusate  2 Tab      And   • polyethylene glycol/lytes  1 Packet      And   • magnesium hydroxide  30 mL      And   • bisacodyl  10 mg     • Respiratory Care per Protocol       • enoxaparin  40 mg     • acetaminophen  650 mg     • ondansetron  4 mg     • ondansetron  4 mg     • nicotine  14 mg      And   • nicotine polacrilex  2 mg     • folic acid  5 mg     • omeprazole  20 mg     • tamsulosin  0.4 mg     • ipratropium-albuterol  3 mL         Current Facility-Administered Medications   Medication Dose Frequency Provider Last Rate Last Dose   • lisinopril (PRINIVIL) tablet 2.5 mg  2.5 mg Q DAY Larry Mittal M.D.   2.5 mg at 02/26/18 0755   • spironolactone (ALDACTONE) tablet 12.5 mg  12.5 mg Q DAY Larry Mittal M.D.   12.5 mg at 02/26/18 0755   • carvedilol (COREG) tablet 6.25 mg  6.25 mg BID WITH MEALS August Juarez M.D.   6.25 mg at 02/26/18 0754   • levothyroxine (SYNTHROID) injection 150 mcg  150 mcg DAILY Larry Mittal M.D.   150 mcg at 02/26/18 0856   • ondansetron (ZOFRAN) syringe/vial injection 4 mg  4 mg Q4HRS PRN Do Garces M.D.       • dexamethasone (DECADRON) injection 4 mg  4 mg Once PRN Do Garces M.D.       • diphenhydrAMINE (BENADRYL) injection 25  mg  25 mg Q6HRS PRN Do Garces M.D.       • haloperidol lactate (HALDOL) injection 1 mg  1 mg Q6HRS PRN Do Garces M.D.       • scopolamine (TRANSDERM-SCOP) patch 1 Patch  1 Patch Q72HRS PRN Do Garces M.D.       • acetaminophen (TYLENOL) tablet 650 mg  650 mg Q6HRS PRN Do Garces M.D.       • atorvastatin (LIPITOR) tablet 80 mg  80 mg QHS Yojana Frias M.D.   80 mg at 02/25/18 2141   • oxyCODONE immediate-release (ROXICODONE) tablet 5 mg  5 mg Q4HRS PRLUIS FELIPE Mittal M.D.   5 mg at 02/23/18 1814    Or   • oxyCODONE immediate release (ROXICODONE) tablet 10 mg  10 mg Q4HRS PRN Larry Mittal M.D.   10 mg at 02/25/18 2141   • aspirin (ASA) chewable tab 81 mg  81 mg DAILY Willi Guerrero M.D.   81 mg at 02/26/18 0755   • budesonide-formoterol (SYMBICORT) 80-4.5 MCG/ACT inhaler 2 Puff  2 Puff BID Willi Guerrero M.D.   2 Puff at 02/26/18 0754   • tiotropium (SPIRIVA) 18 MCG inhalation capsule 1 Cap  1 Cap DAILY Willi Guerrero M.D.   1 Cap at 02/26/18 0754   • magnesium oxide (MAG-OX) tablet 400 mg  400 mg BID Larry Mittal M.D.   400 mg at 02/26/18 0755   • calcium carbonate (TUMS) chewable tab 1,000 mg  1,000 mg TID PRN Wen Stokes M.D.   1,000 mg at 02/21/18 0005   • senna-docusate (PERICOLACE or SENOKOT S) 8.6-50 MG per tablet 2 Tab  2 Tab BID Willi Guerrero M.D.   2 Tab at 02/22/18 2004    And   • polyethylene glycol/lytes (MIRALAX) PACKET 1 Packet  1 Packet QDAY PRN Willi Guerrero M.D.        And   • magnesium hydroxide (MILK OF MAGNESIA) suspension 30 mL  30 mL QDAY PRN Willi Guerrero M.D.        And   • bisacodyl (DULCOLAX) suppository 10 mg  10 mg QDAY PRN Willi Guerrero M.D.       • Respiratory Care per Protocol   Continuous RT Willi Guerrero M.D.       • enoxaparin (LOVENOX) inj 40 mg  40 mg DAILY Willi Guerrero M.D.   40 mg at 02/25/18 2139   • acetaminophen (TYLENOL) tablet 650 mg  650 mg Q6HRS PRN Willi Guerrero M.D.   650 mg at 02/23/18 2047   •  ondansetron (ZOFRAN) syringe/vial injection 4 mg  4 mg Q4HRS PRN Willi Guerrero M.D.       • ondansetron (ZOFRAN ODT) dispertab 4 mg  4 mg Q4HRS PRN Willi Guerrero M.D.       • nicotine (NICODERM) 14 MG/24HR 14 mg  14 mg Daily-0600 Willi Guerrero M.D.   14 mg at 02/26/18 0647    And   • nicotine polacrilex (NICORETTE) 2 MG piece 2 mg  2 mg Q HOUR PRN Willi Guerrero M.D.       • folic acid (FOLVITE) tablet 5 mg  5 mg DAILY Willi Guerrero M.D.   5 mg at 02/26/18 0755   • omeprazole (PRILOSEC) capsule 20 mg  20 mg BID AC Willi Guerrero M.D.   20 mg at 02/26/18 0648   • tamsulosin (FLOMAX) capsule 0.4 mg  0.4 mg QHS Willi Guerrero M.D.   0.4 mg at 02/25/18 2141   • ipratropium-albuterol (DUONEB) nebulizer solution 3 mL  3 mL Q4H PRN (RT) Willi Guerrero M.D.         Last reviewed on 2/19/2018  4:15 PM by Maribel Talbert  Medications reviewed      Imaging reviewed    ECHO(02/24/18):  CONCLUSIONS  Known mitral valve bioprosthesis which is functioning normally with   appropriate transvalvular gradient.    Severely reduced left ventricular systolic function.  Left ventricular ejection fraction is visually estimated to be 20%.       Impressions and Recommendations:    1. HFrEF   2. Ischemic cardiomyopathy without flow limiting stenosis   3. CAD    - continue coreg 6.25 mg bid. Pt to follow up with out patient HF clinic where his dose can be increased to max tolerated    - discharge on lisinopril, ASA and statin. Likely will benefit from out patient initiation of sacubitril-vaalsartan.    - Needs to be seen in HF clinic for evaluation of LVEF and need for ICD placement in 90 days     4. Methamphetamine and tobacco abuse    - counseled extensively regarding stroke, cardiac arrest and death with continued use       5. Hypothyroidism    - may have cardiac side effects from IV synthroid. Consider switching to higher dose oral medications. Per primary team.    - compliance emphasized. Non-adherence to thyroid medications can result in severe  hypothyroidism with cardiac effects.       6. Sepsis due to pneumonia    - improved, per primary team         6. Mitral valve bioprosthetic valve    - if undergoing surgery in the future, must have antibiotic prophylaxis    - f/u as out patient       Stable for discharge with out patient HF clinic f/u from cardiology standpoint. Discussed with Dr. Benoit   Hypothyroidism and pneumonia treatment addressed by primary service.

## 2018-02-26 NOTE — DISCHARGE SUMMARY
Hospital Medicine Discharge Note     Admit Date:  2/19/2018       Discharge Date:   2/26/2018    Attending Physician:  Larry Mittal     Diagnoses (includes active and resolved):   Principal Problem:    CAP (community acquired pneumonia) POA: Yes  Active Problems:    CHF, acute on chronic (CMS-[facility]) POA: Yes    Sepsis (CMS-HCC) POA: Yes    Acute and chronic respiratory failure (acute-on-chronic) (CMS-HCC) POA: Yes    Transaminitis POA: Yes    Elevated troponin POA: Yes    COPD (chronic obstructive pulmonary disease) (CMS-HCC) POA: Yes    Hypothyroid POA: Yes    Pleural effusion POA: Yes    Renal insufficiency POA: No    Hypomagnesemia POA: Yes    S/P MVR (mitral valve replacement) POA: Yes    CAD (coronary artery disease) POA: Yes    Tobacco abuse (Chronic) POA: Yes  Resolved Problems:    * No resolved hospital problems. *      Hospital Summary (Brief Narrative):       57-year-old white male was admitted for pneumonia, sepsis, respiratory failure. He was started on IV Unasyn and responded well to therapy. For his respiratory failure he went back to his baseline oxygen of 2 L per minute nasal cannula. He was also noted to have heart failure, it is unknown what his baseline ejection fraction is, he does have a record of it about a year ago. Cardiology was consulted, his ejection fraction was noted to be 20% he appeared to have cardiomyopathy. Cardiac catheterization was done with results noted below. A KATY was done to evaluate his history of mitral valve replacement and there were no problems noted with it. He was diuresed with IV Lasix, started on heart failure medication, carvedilol, lisinopril, Aldactone. He was diuresed well. Atelectatic issues which were addressed replacement. He had elevated LFTs which trended down, most likely secondary to hepatic congestion from heart failure. He did have hypothyroidism his TSH was noted to be 80, he was placed on IV Synthroid temporarily, his outpatient Synthroid dose will  be increased on discharge. He will need close follow-up with cardiology as well as primary care physician.         Consultants:      Cardiology - Cy    Procedures:        Severely reduced left ventricular systolic function, ejection fraction 22% with global hypokinesis, but no significant mitral regurgitation. A 50% stenosis in mid left anterior descending artery, but no flow limiting disease.    Discharge Medications:        Medication Reconciliation Completed     Medication List      START taking these medications      Instructions   aspirin 81 MG Chew chewable tablet  Start taking on:  2/27/2018  Commonly known as:  ASA   Take 1 Tab by mouth every day.  Dose:  81 mg     atorvastatin 80 MG tablet  Commonly known as:  LIPITOR   Take 1 Tab by mouth every bedtime.  Dose:  80 mg     carvedilol 6.25 MG Tabs  Commonly known as:  COREG   Take 1 Tab by mouth 2 times a day, with meals.  Dose:  6.25 mg     lisinopril 2.5 MG Tabs  Start taking on:  2/27/2018  Commonly known as:  PRINIVIL   Take 1 Tab by mouth every day.  Dose:  2.5 mg     spironolactone 25 MG Tabs  Start taking on:  2/27/2018  Commonly known as:  ALDACTONE   Take 0.5 Tabs by mouth every day.  Dose:  12.5 mg        CHANGE how you take these medications      Instructions   levothyroxine 300 MCG Tabs  What changed:  · medication strength  · how much to take  · additional instructions  · Another medication with the same name was removed. Continue taking this medication, and follow the directions you see here.  Commonly known as:  SYNTHROID   Take 1 Tab by mouth Every morning on an empty stomach.  Dose:  300 mcg        CONTINUE taking these medications      Instructions   BREO ELLIPTA 200-25 MCG/INH Aepb  Generic drug:  Fluticasone Furoate-Vilanterol   Inhale 1 Puff by mouth every day.  Dose:  1 Puff     folic acid 1 MG Tabs  Commonly known as:  FOLVITE   Take 5 mg by mouth every day.  Dose:  5 mg     gabapentin 400 MG Caps  Commonly known as:  NEURONTIN    Take 1,200 mg by mouth every bedtime.  Dose:  1200 mg     HYDROcodone/acetaminophen  MG Tabs  Commonly known as:  NORCO   Take 1-2 Tabs by mouth every 6 hours as needed for Moderate Pain.  Dose:  1-2 Tab     INCRUSE ELLIPTA 62.5 MCG/INH Aepb  Generic drug:  Umeclidinium Bromide   Inhale 1 Puff by mouth every day.  Dose:  1 Puff     omeprazole 20 MG delayed-release capsule  Commonly known as:  PRILOSEC   Take 20 mg by mouth 2 times a day.  Dose:  20 mg     tamsulosin 0.4 MG capsule  Commonly known as:  FLOMAX   Take 0.4 mg by mouth every bedtime.  Dose:  0.4 mg        STOP taking these medications    simvastatin 20 MG Tabs  Commonly known as:  ZOCOR     terazosin 2 MG Caps  Commonly known as:  HYTRIN              Disposition:  Home    Diet:   Cardiac low salt    Activity:   As tolerated    Code status:  Full    Primary Care Provider:    Ana Flores M.D.    Follow up appointment details :        Ana Flores M.D.  [address] [place] [address]   [phone]  Go on 2/28/2018  Please arrive at 9:45 am for your appointment. Thank you.    No future appointments.    Pending Studies:        None    Time spent on discharge day patient visit: 40 minutes    #################################################      Most Recent Labs:    Lab Results   Component Value Date/Time    WBC 9.5 02/26/2018 02:25 AM    RBC 4.08 (L) 02/26/2018 02:25 AM    HEMOGLOBIN 11.6 (L) 02/26/2018 02:25 AM    HEMATOCRIT 35.9 (L) 02/26/2018 02:25 AM    MCV 88.0 02/26/2018 02:25 AM    MCH 28.4 02/26/2018 02:25 AM    MCHC 32.3 (L) 02/26/2018 02:25 AM    MPV 10.8 02/26/2018 02:25 AM    NEUTSPOLYS 62.80 02/26/2018 02:25 AM    LYMPHOCYTES 17.50 (L) 02/26/2018 02:25 AM    MONOCYTES 8.90 02/26/2018 02:25 AM    EOSINOPHILS 9.30 (H) 02/26/2018 02:25 AM    BASOPHILS 1.00 02/26/2018 02:25 AM      Lab Results   Component Value Date/Time    SODIUM 135 02/26/2018 02:26 AM    POTASSIUM 4.5 02/26/2018 02:26 AM    CHLORIDE 101 02/26/2018 02:26  AM    CO2 27 02/26/2018 02:26 AM    GLUCOSE 95 02/26/2018 02:26 AM    BUN 33 (H) 02/26/2018 02:26 AM    CREATININE 1.07 02/26/2018 02:26 AM      Lab Results   Component Value Date/Time    ALTSGPT 78 (H) 02/26/2018 02:26 AM    ASTSGOT 23 02/26/2018 02:26 AM    ALKPHOSPHAT 126 (H) 02/26/2018 02:26 AM    TBILIRUBIN 0.5 02/26/2018 02:26 AM    ALBUMIN 3.5 02/26/2018 02:26 AM    GLOBULIN 2.7 02/26/2018 02:26 AM    INR 1.06 02/22/2018 04:50 PM     Lab Results   Component Value Date/Time    PROTHROMBTM 13.5 02/22/2018 04:50 PM    INR 1.06 02/22/2018 04:50 PM        Imaging/ Testing:      TRANSESOPHAGEAL ECHO W/O CONT (Order not available for [facility])   Final Result      DX-CHEST-2 VIEWS   Final Result      Increased inflation from prior exam with improvement of LEFT midlung infiltrate and LEFT pleural fluid collection.      DX-SHOULDER 2+ LEFT   Final Result      No evidence of acute fracture or dislocation.      Degenerative changes of the acromioclavicular joint.      Airspace opacity in the left lower midlung as seen on prior plain film of the chest. Follow-up to radiographic resolution is recommended.            Echocardiogram Comp W/O Cont   Final Result      US-ABDOMEN COMPLETE SURVEY   Final Result      1.  There is cholelithiasis without wall thickening or biliary dilatation.   2.  There is a small amount of abdominal ascites.   3.  There is a small amount of left pleural effusion.   4.  There is a nonobstructive left renal calcification.         DX-CHEST-PORTABLE (1 VIEW)   Final Result      Enlarged cardiac silhouette with bilateral pleural effusions.      Consolidation in the left lung likely within the superior segment of the left lower lobe suggestive of pneumonia versus asymmetric pulmonary edema.      OUTSIDE IMAGES-DX CHEST   Final Result          Instructions:      The patient was instructed to return to the ER in the event of worsening symptoms. I have counseled the patient on the importance of  compliance and the patient has agreed to proceed with all medical recommendations and follow up plan indicated above.   The patient understands that all medications come with benefits and risks. Risks may include permanent injury or death and these risks can be minimized with close reassessment and monitoring.

## 2018-02-26 NOTE — HEART FAILURE PROGRAM
Cardiovascular Nurse Navigator () Progress Note:  As mentioned in previous notes, patient has [employer] insurance and therefore can not be seen at [facility] (AKA HF clinic) unless he can pay out of pocket.    Because of this, patient has been scheduled with PCP in [place], [place] where a referral to [place] cardiology can be obtained.    Ana Flores M.D.  Go on 2/28/2018  Please arrive at 9:45 am for your appointment. Thank you.  [address], [place]   [phone]         Thank you and please call with questions, Ashley

## 2018-02-26 NOTE — PROGRESS NOTES
Discharge instructions reviewed with patient, all questions answered. Patient reports understanding heart failure instructions. Follow-up appointment scheduled with PCP in two days 2/28, pt reports ability to attend appointment, needs to see PCP to consult cardiologist in [place].  Instructed patient to call family for ride and ensure they bring his home oxygen tank to transport him home.

## 2018-02-27 ENCOUNTER — PATIENT OUTREACH (OUTPATIENT)
Dept: HEALTH INFORMATION MANAGEMENT | Facility: OTHER | Age: 58
End: 2018-02-27

## 2018-04-03 DIAGNOSIS — R07.9 CHEST PAIN, UNSPECIFIED TYPE: Primary | ICD-10-CM

## 2018-04-04 LAB — EKG IMPRESSION: NORMAL

## 2018-09-25 DIAGNOSIS — R53.1 WEAKNESS: ICD-10-CM

## 2018-10-12 LAB — EKG IMPRESSION: NORMAL

## 2022-11-26 ENCOUNTER — APPOINTMENT (OUTPATIENT)
Dept: RADIOLOGY | Facility: MEDICAL CENTER | Age: 62
DRG: 683 | End: 2022-11-26
Attending: EMERGENCY MEDICINE
Payer: COMMERCIAL

## 2022-11-26 ENCOUNTER — HOSPITAL ENCOUNTER (INPATIENT)
Facility: MEDICAL CENTER | Age: 62
LOS: 3 days | DRG: 683 | End: 2022-11-29
Attending: EMERGENCY MEDICINE | Admitting: HOSPITALIST
Payer: COMMERCIAL

## 2022-11-26 DIAGNOSIS — N17.9 AKI (ACUTE KIDNEY INJURY) (HCC): ICD-10-CM

## 2022-11-26 DIAGNOSIS — J18.9 PNEUMONIA DUE TO INFECTIOUS ORGANISM, UNSPECIFIED LATERALITY, UNSPECIFIED PART OF LUNG: ICD-10-CM

## 2022-11-26 PROBLEM — R79.89 ABNORMAL TSH: Status: ACTIVE | Noted: 2022-11-26

## 2022-11-26 PROBLEM — I50.20 HFREF (HEART FAILURE WITH REDUCED EJECTION FRACTION) (HCC): Status: ACTIVE | Noted: 2022-11-26

## 2022-11-26 PROBLEM — J96.11 CHRONIC RESPIRATORY FAILURE WITH HYPOXIA (HCC): Status: ACTIVE | Noted: 2022-11-26

## 2022-11-26 LAB
ALBUMIN SERPL BCP-MCNC: 3.9 G/DL (ref 3.2–4.9)
ALBUMIN/GLOB SERPL: 1.3 G/DL
ALP SERPL-CCNC: 95 U/L (ref 30–99)
ALT SERPL-CCNC: 17 U/L (ref 2–50)
ANION GAP SERPL CALC-SCNC: 18 MMOL/L (ref 7–16)
AST SERPL-CCNC: 41 U/L (ref 12–45)
BASOPHILS # BLD AUTO: 0.7 % (ref 0–1.8)
BASOPHILS # BLD: 0.08 K/UL (ref 0–0.12)
BILIRUB SERPL-MCNC: 0.3 MG/DL (ref 0.1–1.5)
BUN SERPL-MCNC: 56 MG/DL (ref 8–22)
CALCIUM SERPL-MCNC: 8.6 MG/DL (ref 8.5–10.5)
CHLORIDE SERPL-SCNC: 100 MMOL/L (ref 96–112)
CO2 SERPL-SCNC: 17 MMOL/L (ref 20–33)
CREAT SERPL-MCNC: 2.19 MG/DL (ref 0.5–1.4)
EKG IMPRESSION: NORMAL
EKG IMPRESSION: NORMAL
EOSINOPHIL # BLD AUTO: 0 K/UL (ref 0–0.51)
EOSINOPHIL NFR BLD: 0 % (ref 0–6.9)
ERYTHROCYTE [DISTWIDTH] IN BLOOD BY AUTOMATED COUNT: 47.7 FL (ref 35.9–50)
FLUAV RNA SPEC QL NAA+PROBE: NEGATIVE
FLUBV RNA SPEC QL NAA+PROBE: NEGATIVE
GFR SERPLBLD CREATININE-BSD FMLA CKD-EPI: 33 ML/MIN/1.73 M 2
GLOBULIN SER CALC-MCNC: 3 G/DL (ref 1.9–3.5)
GLUCOSE SERPL-MCNC: 125 MG/DL (ref 65–99)
HCT VFR BLD AUTO: 38.4 % (ref 42–52)
HGB BLD-MCNC: 12.8 G/DL (ref 14–18)
IMM GRANULOCYTES # BLD AUTO: 0.06 K/UL (ref 0–0.11)
IMM GRANULOCYTES NFR BLD AUTO: 0.5 % (ref 0–0.9)
LACTATE SERPL-SCNC: 2 MMOL/L (ref 0.5–2)
LYMPHOCYTES # BLD AUTO: 1 K/UL (ref 1–4.8)
LYMPHOCYTES NFR BLD: 8.5 % (ref 22–41)
MCH RBC QN AUTO: 29.8 PG (ref 27–33)
MCHC RBC AUTO-ENTMCNC: 33.3 G/DL (ref 33.7–35.3)
MCV RBC AUTO: 89.5 FL (ref 81.4–97.8)
MONOCYTES # BLD AUTO: 1.48 K/UL (ref 0–0.85)
MONOCYTES NFR BLD AUTO: 12.6 % (ref 0–13.4)
NEUTROPHILS # BLD AUTO: 9.08 K/UL (ref 1.82–7.42)
NEUTROPHILS NFR BLD: 77.7 % (ref 44–72)
NRBC # BLD AUTO: 0 K/UL
NRBC BLD-RTO: 0 /100 WBC
NT-PROBNP SERPL IA-MCNC: 1756 PG/ML (ref 0–125)
PLATELET # BLD AUTO: 218 K/UL (ref 164–446)
PMV BLD AUTO: 11.6 FL (ref 9–12.9)
POTASSIUM SERPL-SCNC: 4 MMOL/L (ref 3.6–5.5)
PROCALCITONIN SERPL-MCNC: 1.58 NG/ML
PROT SERPL-MCNC: 6.9 G/DL (ref 6–8.2)
RBC # BLD AUTO: 4.29 M/UL (ref 4.7–6.1)
RSV RNA SPEC QL NAA+PROBE: NEGATIVE
SARS-COV-2 RNA RESP QL NAA+PROBE: NOTDETECTED
SODIUM SERPL-SCNC: 135 MMOL/L (ref 135–145)
SPECIMEN SOURCE: NORMAL
T4 FREE SERPL-MCNC: 0.5 NG/DL (ref 0.93–1.7)
TROPONIN T SERPL-MCNC: 45 NG/L (ref 6–19)
TSH SERPL DL<=0.005 MIU/L-ACNC: 87.2 UIU/ML (ref 0.38–5.33)
WBC # BLD AUTO: 11.7 K/UL (ref 4.8–10.8)

## 2022-11-26 PROCEDURE — C9803 HOPD COVID-19 SPEC COLLECT: HCPCS | Performed by: EMERGENCY MEDICINE

## 2022-11-26 PROCEDURE — 83880 ASSAY OF NATRIURETIC PEPTIDE: CPT

## 2022-11-26 PROCEDURE — 84439 ASSAY OF FREE THYROXINE: CPT

## 2022-11-26 PROCEDURE — 85025 COMPLETE CBC W/AUTO DIFF WBC: CPT

## 2022-11-26 PROCEDURE — 71045 X-RAY EXAM CHEST 1 VIEW: CPT

## 2022-11-26 PROCEDURE — 770006 HCHG ROOM/CARE - MED/SURG/GYN SEMI*

## 2022-11-26 PROCEDURE — 80053 COMPREHEN METABOLIC PANEL: CPT

## 2022-11-26 PROCEDURE — A9270 NON-COVERED ITEM OR SERVICE: HCPCS | Performed by: HOSPITALIST

## 2022-11-26 PROCEDURE — 83605 ASSAY OF LACTIC ACID: CPT

## 2022-11-26 PROCEDURE — 0241U HCHG SARS-COV-2 COVID-19 NFCT DS RESP RNA 4 TRGT MIC: CPT

## 2022-11-26 PROCEDURE — 36415 COLL VENOUS BLD VENIPUNCTURE: CPT

## 2022-11-26 PROCEDURE — 99285 EMERGENCY DEPT VISIT HI MDM: CPT

## 2022-11-26 PROCEDURE — 93005 ELECTROCARDIOGRAM TRACING: CPT | Performed by: EMERGENCY MEDICINE

## 2022-11-26 PROCEDURE — 84484 ASSAY OF TROPONIN QUANT: CPT

## 2022-11-26 PROCEDURE — 700105 HCHG RX REV CODE 258: Performed by: HOSPITALIST

## 2022-11-26 PROCEDURE — 84145 PROCALCITONIN (PCT): CPT

## 2022-11-26 PROCEDURE — 700105 HCHG RX REV CODE 258: Performed by: EMERGENCY MEDICINE

## 2022-11-26 PROCEDURE — 96365 THER/PROPH/DIAG IV INF INIT: CPT

## 2022-11-26 PROCEDURE — 99223 1ST HOSP IP/OBS HIGH 75: CPT | Performed by: HOSPITALIST

## 2022-11-26 PROCEDURE — 700102 HCHG RX REV CODE 250 W/ 637 OVERRIDE(OP): Performed by: HOSPITALIST

## 2022-11-26 PROCEDURE — 87040 BLOOD CULTURE FOR BACTERIA: CPT | Mod: 91

## 2022-11-26 PROCEDURE — 700111 HCHG RX REV CODE 636 W/ 250 OVERRIDE (IP): Performed by: EMERGENCY MEDICINE

## 2022-11-26 PROCEDURE — 84443 ASSAY THYROID STIM HORMONE: CPT

## 2022-11-26 PROCEDURE — 700111 HCHG RX REV CODE 636 W/ 250 OVERRIDE (IP): Performed by: HOSPITALIST

## 2022-11-26 RX ORDER — SODIUM CHLORIDE 9 MG/ML
INJECTION, SOLUTION INTRAVENOUS CONTINUOUS
Status: DISCONTINUED | OUTPATIENT
Start: 2022-11-26 | End: 2022-11-27

## 2022-11-26 RX ORDER — ATORVASTATIN CALCIUM 80 MG/1
80 TABLET, FILM COATED ORAL
Status: DISCONTINUED | OUTPATIENT
Start: 2022-11-26 | End: 2022-11-29 | Stop reason: HOSPADM

## 2022-11-26 RX ORDER — OMEPRAZOLE 20 MG/1
20 CAPSULE, DELAYED RELEASE ORAL 2 TIMES DAILY
Status: DISCONTINUED | OUTPATIENT
Start: 2022-11-26 | End: 2022-11-29 | Stop reason: HOSPADM

## 2022-11-26 RX ORDER — PREGABALIN 300 MG/1
300 CAPSULE ORAL DAILY
COMMUNITY

## 2022-11-26 RX ORDER — FOLIC ACID 1 MG/1
1 TABLET ORAL DAILY
Status: DISCONTINUED | OUTPATIENT
Start: 2022-11-27 | End: 2022-11-29 | Stop reason: HOSPADM

## 2022-11-26 RX ORDER — AZITHROMYCIN 500 MG/1
500 INJECTION, POWDER, LYOPHILIZED, FOR SOLUTION INTRAVENOUS ONCE
Status: DISCONTINUED | OUTPATIENT
Start: 2022-11-26 | End: 2022-11-26

## 2022-11-26 RX ORDER — CARVEDILOL 6.25 MG/1
6.25 TABLET ORAL 2 TIMES DAILY WITH MEALS
Status: DISCONTINUED | OUTPATIENT
Start: 2022-11-26 | End: 2022-11-29 | Stop reason: HOSPADM

## 2022-11-26 RX ORDER — HEPARIN SODIUM 5000 [USP'U]/ML
5000 INJECTION, SOLUTION INTRAVENOUS; SUBCUTANEOUS EVERY 8 HOURS
Status: DISCONTINUED | OUTPATIENT
Start: 2022-11-26 | End: 2022-11-29 | Stop reason: HOSPADM

## 2022-11-26 RX ORDER — BUDESONIDE AND FORMOTEROL FUMARATE DIHYDRATE 160; 4.5 UG/1; UG/1
2 AEROSOL RESPIRATORY (INHALATION)
Status: DISCONTINUED | OUTPATIENT
Start: 2022-11-26 | End: 2022-11-29 | Stop reason: HOSPADM

## 2022-11-26 RX ORDER — AMOXICILLIN 250 MG
2 CAPSULE ORAL 2 TIMES DAILY
Status: DISCONTINUED | OUTPATIENT
Start: 2022-11-26 | End: 2022-11-29 | Stop reason: HOSPADM

## 2022-11-26 RX ORDER — LEVOTHYROXINE SODIUM 0.1 MG/1
300 TABLET ORAL
Status: DISCONTINUED | OUTPATIENT
Start: 2022-11-27 | End: 2022-11-27

## 2022-11-26 RX ORDER — POLYETHYLENE GLYCOL 3350 17 G/17G
1 POWDER, FOR SOLUTION ORAL
Status: DISCONTINUED | OUTPATIENT
Start: 2022-11-26 | End: 2022-11-29 | Stop reason: HOSPADM

## 2022-11-26 RX ORDER — ACETAMINOPHEN 325 MG/1
650 TABLET ORAL EVERY 6 HOURS PRN
Status: DISCONTINUED | OUTPATIENT
Start: 2022-11-26 | End: 2022-11-29 | Stop reason: HOSPADM

## 2022-11-26 RX ORDER — TAMSULOSIN HYDROCHLORIDE 0.4 MG/1
0.4 CAPSULE ORAL
Status: DISCONTINUED | OUTPATIENT
Start: 2022-11-26 | End: 2022-11-29 | Stop reason: HOSPADM

## 2022-11-26 RX ORDER — BISACODYL 10 MG
10 SUPPOSITORY, RECTAL RECTAL
Status: DISCONTINUED | OUTPATIENT
Start: 2022-11-26 | End: 2022-11-29 | Stop reason: HOSPADM

## 2022-11-26 RX ORDER — SPIRONOLACTONE 25 MG/1
12.5 TABLET ORAL DAILY
COMMUNITY

## 2022-11-26 RX ORDER — SODIUM CHLORIDE, SODIUM LACTATE, POTASSIUM CHLORIDE, CALCIUM CHLORIDE 600; 310; 30; 20 MG/100ML; MG/100ML; MG/100ML; MG/100ML
1000 INJECTION, SOLUTION INTRAVENOUS ONCE
Status: COMPLETED | OUTPATIENT
Start: 2022-11-26 | End: 2022-11-26

## 2022-11-26 RX ADMIN — OMEPRAZOLE 20 MG: 20 CAPSULE, DELAYED RELEASE ORAL at 17:27

## 2022-11-26 RX ADMIN — SODIUM CHLORIDE, POTASSIUM CHLORIDE, SODIUM LACTATE AND CALCIUM CHLORIDE 1000 ML: 600; 310; 30; 20 INJECTION, SOLUTION INTRAVENOUS at 14:28

## 2022-11-26 RX ADMIN — CARVEDILOL 6.25 MG: 6.25 TABLET, FILM COATED ORAL at 17:27

## 2022-11-26 RX ADMIN — SODIUM CHLORIDE: 9 INJECTION, SOLUTION INTRAVENOUS at 17:30

## 2022-11-26 RX ADMIN — TAMSULOSIN HYDROCHLORIDE 0.4 MG: 0.4 CAPSULE ORAL at 21:01

## 2022-11-26 RX ADMIN — BUDESONIDE AND FORMOTEROL FUMARATE DIHYDRATE 2 PUFF: 160; 4.5 AEROSOL RESPIRATORY (INHALATION) at 18:38

## 2022-11-26 RX ADMIN — ACETAMINOPHEN 650 MG: 325 TABLET, FILM COATED ORAL at 17:34

## 2022-11-26 RX ADMIN — HEPARIN SODIUM 5000 UNITS: 5000 INJECTION, SOLUTION INTRAVENOUS; SUBCUTANEOUS at 17:27

## 2022-11-26 RX ADMIN — ATORVASTATIN CALCIUM 80 MG: 80 TABLET, FILM COATED ORAL at 21:00

## 2022-11-26 RX ADMIN — AMPICILLIN AND SULBACTAM 3 G: 1; 2 INJECTION, POWDER, FOR SOLUTION INTRAMUSCULAR; INTRAVENOUS at 15:37

## 2022-11-26 RX ADMIN — HEPARIN SODIUM 5000 UNITS: 5000 INJECTION, SOLUTION INTRAVENOUS; SUBCUTANEOUS at 21:05

## 2022-11-26 ASSESSMENT — LIFESTYLE VARIABLES
HAVE YOU EVER FELT YOU SHOULD CUT DOWN ON YOUR DRINKING: NO
EVER FELT BAD OR GUILTY ABOUT YOUR DRINKING: NO
EVER HAD A DRINK FIRST THING IN THE MORNING TO STEADY YOUR NERVES TO GET RID OF A HANGOVER: NO
ALCOHOL_USE: NO
CONSUMPTION TOTAL: NEGATIVE
HAVE PEOPLE ANNOYED YOU BY CRITICIZING YOUR DRINKING: NO
AVERAGE NUMBER OF DAYS PER WEEK YOU HAVE A DRINK CONTAINING ALCOHOL: 0
TOTAL SCORE: 0
DOES PATIENT WANT TO STOP DRINKING: CANNOT ASSESS
TOTAL SCORE: 0
ON A TYPICAL DAY WHEN YOU DRINK ALCOHOL HOW MANY DRINKS DO YOU HAVE: 0
TOTAL SCORE: 0
HOW MANY TIMES IN THE PAST YEAR HAVE YOU HAD 5 OR MORE DRINKS IN A DAY: 0

## 2022-11-26 ASSESSMENT — PATIENT HEALTH QUESTIONNAIRE - PHQ9
2. FEELING DOWN, DEPRESSED, IRRITABLE, OR HOPELESS: NOT AT ALL
2. FEELING DOWN, DEPRESSED, IRRITABLE, OR HOPELESS: NOT AT ALL
SUM OF ALL RESPONSES TO PHQ9 QUESTIONS 1 AND 2: 0
SUM OF ALL RESPONSES TO PHQ9 QUESTIONS 1 AND 2: 0
1. LITTLE INTEREST OR PLEASURE IN DOING THINGS: NOT AT ALL
1. LITTLE INTEREST OR PLEASURE IN DOING THINGS: NOT AT ALL

## 2022-11-26 ASSESSMENT — ENCOUNTER SYMPTOMS
DOUBLE VISION: 0
BACK PAIN: 0
COUGH: 0
BLURRED VISION: 0
HEARTBURN: 0
NAUSEA: 0
FALLS: 0
PALPITATIONS: 0
LOSS OF CONSCIOUSNESS: 0
SPUTUM PRODUCTION: 0
CHILLS: 0
DIZZINESS: 0
SHORTNESS OF BREATH: 1
FEVER: 0
SORE THROAT: 0
HEADACHES: 0
ABDOMINAL PAIN: 0

## 2022-11-26 ASSESSMENT — FIBROSIS 4 INDEX: FIB4 SCORE: 2.92

## 2022-11-26 ASSESSMENT — PAIN DESCRIPTION - PAIN TYPE: TYPE: ACUTE PAIN

## 2022-11-26 NOTE — ED NOTES
Med Rec Complete per patient  Allergies Reviewed with patient  No antibiotics within the last 30 days  Patient's Preferred Pharmacy:  [facility]   (Deb)       or     [facility] in [place], [place]      Patient would like to receive medications via [facility] to [facility] if receiving prescriptions upon discharge.
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
## 2022-11-26 NOTE — ED NOTES
FUTURE VISIT INFORMATION      FUTURE VISIT INFORMATION:    Date: 3/25/19    Time: 9:15AM    [place] [facility] [place]FORMATION:    Referring provider:  Kristin Marin MD PhD    Referring providers clinic:  [facility]    Reason for visit/diagnosis  nodular density on lateral oropharyngeal wall seen on PET    RECORDS REQUESTED FROM:       Clinic name Comments Records Status Imaging Status   [facility] 3/19/19 referral    [facility] 3/5/19 notes with Dr Marco Lopez [facility]    [facility] 3/9/19 PET CT and CT Neck   1/4/19 CT Chest and XR Chest Epic PACS                          Phlebotomy contacted for 2nd BC draw.

## 2022-11-26 NOTE — ED TRIAGE NOTES
"Chief Complaint   Patient presents with    Shortness of Breath     Pt brought in as code assist from T6 after sudden onset SOB and cyanosis. Was visiting his partner upstairs who is currently infected with influenza A. Pt has history of COPD and has not had access to his normal home O2 for over 1 week now as \"my previous tank blew up when I was smoking.\" Pt states his current O2 supply company is \"not talking to me right now.\" RA saturation upstairs 80%. Pt at 100% on 6L upon arrival. Now 99% on normal 3L O2.     Weakness     For past few days       /87   Pulse 92   Temp 37.8 °C (100 °F) (Temporal)   Resp 19   Ht 1.702 m (5' 7\")   Wt 54.4 kg (120 lb)   SpO2 96%   BMI 18.79 kg/m²   "

## 2022-11-26 NOTE — ED PROVIDER NOTES
"CHIEF COMPLAINT  Chief Complaint   Patient presents with    Shortness of Breath     Pt brought in as code assist from T6 after sudden onset SOB and cyanosis. Was visiting his partner upstairs who is currently infected with influenza A. Pt has history of COPD and has not had access to his normal home O2 for over 1 week now as \"my previous tank blew up when I was smoking.\" Pt states his current O2 supply company is \"not talking to me right now.\" RA saturation upstairs 80%. Pt at 100% on 6L upon arrival. Now 99% on normal 3L O2.     Weakness     For past few days       HPI  Phillip Ann is a 62 y.o. male with an apparent history of oxygen dependent secondary to tobacco use.  He also with a history of hypertension dyslipidemia and hypothyroidism.  He presents short of breath secondary to running out of his oxygen.  He states he has had issues with his oxygen supply company who is \"not talking to me right now\".  He was here visiting his wife who has influenza-she is in the ICU.  He was noted to have a room air saturation of 80%.  He was placed back on his 3 L minutes and is now 99%.  He does note some generalized weakness.  No chest pain except with cough.  He has had a slight increasing cough recently productive of mostly clear sputum.  He had a low-grade temperature in triage.  No nausea vomiting or diarrhea.  No abdominal pain.    REVIEW OF SYSTEMS  All other systems are negative.     PAST MEDICAL HISTORY  COPD, hypertension, dyslipidemia, hypothyroidism    FAMILY HISTORY  None noted    SOCIAL HISTORY  Social History     Socioeconomic History    Marital status:        SURGICAL HISTORY  No past surgical history on file.    CURRENT MEDICATIONS  Home Medications       Reviewed by Alize Aggarwal R.N. (Registered Nurse) on 11/26/22 at 1326  Med List Status: Not Addressed     Medication Last Dose Status   aspirin (ASA) 81 MG Chew Tab chewable tablet  Active   atorvastatin (LIPITOR) 80 MG tablet  Active " "  carvedilol (COREG) 6.25 MG Tab  Active   Fluticasone Furoate-Vilanterol (BREO ELLIPTA) 200-25 MCG/INH AEROSOL POWDER, BREATH ACTIVATED  Active   folic acid (FOLVITE) 1 MG Tab  Active   gabapentin (NEURONTIN) 400 MG Cap  Active   HYDROcodone/acetaminophen (NORCO)  MG Tab  Active   levothyroxine (SYNTHROID) 300 MCG Tab  Active   lisinopril (PRINIVIL) 2.5 MG Tab  Active   omeprazole (PRILOSEC) 20 MG delayed-release capsule  Active   spironolactone (ALDACTONE) 25 MG Tab  Active   tamsulosin (FLOMAX) 0.4 MG capsule  Active   Umeclidinium Bromide (INCRUSE ELLIPTA) 62.5 MCG/INH AEROSOL POWDER, BREATH ACTIVATED  Active                    ALLERGIES  Allergies   Allergen Reactions    Hydromorphone Itching, Vomiting and Nausea       PHYSICAL EXAM  VITAL SIGNS: /87   Pulse 92   Temp 37.8 °C (100 °F) (Temporal)   Resp 19   Ht 1.702 m (5' 7\")   Wt 54.4 kg (120 lb)   SpO2 96%   BMI 18.79 kg/m²      Constitutional: Well developed, Well nourished, No acute distress, Non-toxic appearance.   HENT: Normocephalic, Atraumatic, mucous membranes moist, no erythema, exudates, swelling, or masses, nares patent  Eyes: nonicteric  Neck: Supple, no meningismus  Lymphatic: No lymphadenopathy noted.   Cardiovascular: Regular rate and rhythm, no gallops rubs or murmurs  Lungs: Clear bilaterally   Abdomen: Soft and nontender throughout  Skin: Warm, Dry, no rash  Genitalia: Deferred  Rectal: Deferred  Extremities: No edema  Neurologic: Alert, appropriate, follows commands, moving all extremities, normal speech   Psychiatric: Affect normal    LABS  Results for orders placed or performed during the hospital encounter of 11/26/22   CBC with Differential   Result Value Ref Range    WBC 11.7 (H) 4.8 - 10.8 K/uL    RBC 4.29 (L) 4.70 - 6.10 M/uL    Hemoglobin 12.8 (L) 14.0 - 18.0 g/dL    Hematocrit 38.4 (L) 42.0 - 52.0 %    MCV 89.5 81.4 - 97.8 fL    MCH 29.8 27.0 - 33.0 pg    MCHC 33.3 (L) 33.7 - 35.3 g/dL    RDW 47.7 35.9 - 50.0 fL "    Platelet Count 218 164 - 446 K/uL    MPV 11.6 9.0 - 12.9 fL    Neutrophils-Polys 77.70 (H) 44.00 - 72.00 %    Lymphocytes 8.50 (L) 22.00 - 41.00 %    Monocytes 12.60 0.00 - 13.40 %    Eosinophils 0.00 0.00 - 6.90 %    Basophils 0.70 0.00 - 1.80 %    Immature Granulocytes 0.50 0.00 - 0.90 %    Nucleated RBC 0.00 /100 WBC    Neutrophils (Absolute) 9.08 (H) 1.82 - 7.42 K/uL    Lymphs (Absolute) 1.00 1.00 - 4.80 K/uL    Monos (Absolute) 1.48 (H) 0.00 - 0.85 K/uL    Eos (Absolute) 0.00 0.00 - 0.51 K/uL    Baso (Absolute) 0.08 0.00 - 0.12 K/uL    Immature Granulocytes (abs) 0.06 0.00 - 0.11 K/uL    NRBC (Absolute) 0.00 K/uL   Comp Metabolic Panel   Result Value Ref Range    Sodium 135 135 - 145 mmol/L    Potassium 4.0 3.6 - 5.5 mmol/L    Chloride 100 96 - 112 mmol/L    Co2 17 (L) 20 - 33 mmol/L    Anion Gap 18.0 (H) 7.0 - 16.0    Glucose 125 (H) 65 - 99 mg/dL    Bun 56 (H) 8 - 22 mg/dL    Creatinine 2.19 (H) 0.50 - 1.40 mg/dL    Calcium 8.6 8.5 - 10.5 mg/dL    AST(SGOT) 41 12 - 45 U/L    ALT(SGPT) 17 2 - 50 U/L    Alkaline Phosphatase 95 30 - 99 U/L    Total Bilirubin 0.3 0.1 - 1.5 mg/dL    Albumin 3.9 3.2 - 4.9 g/dL    Total Protein 6.9 6.0 - 8.2 g/dL    Globulin 3.0 1.9 - 3.5 g/dL    A-G Ratio 1.3 g/dL   COV-2, FLU A/B, AND RSV BY PCR (2-4 HOURS CEPHEID): Collect NP swab in VTM    Specimen: Respirate   Result Value Ref Range    Influenza virus A RNA Negative Negative    Influenza virus B, PCR Negative Negative    RSV, PCR Negative Negative    SARS-CoV-2 by PCR NotDetected     SARS-CoV-2 Source NP Swab    LACTIC ACID   Result Value Ref Range    Lactic Acid 2.0 0.5 - 2.0 mmol/L   proBrain Natriuretic Peptide, NT   Result Value Ref Range    NT-proBNP 1756 (H) 0 - 125 pg/mL   TROPONIN   Result Value Ref Range    Troponin T 45 (H) 6 - 19 ng/L   ESTIMATED GFR   Result Value Ref Range    GFR (CKD-EPI) 33 (A) >60 mL/min/1.73 m 2   EKG   Result Value Ref Range    Report       [facility] Emergency  Dept.    Test Date:  2022  Pt Name:    MARY OH                Department: ER  MRN:        [id number]                      Room:        39  Gender:     Male                         Technician: [id number]  :        [id number]                   Requested By:ER TRIAGE PROTOCOL  Order #:    [id number]                    Reading MD:    Measurements  Intervals                                Axis  Rate:       87                           P:          76  NC:         116                          QRS:        69  QRSD:       92                           T:          62  QT:         378  QTc:        455    Interpretive Statements  Sinus rhythm  Borderline short NC interval  Probable left atrial enlargement  Anteroseptal infarct, old  Minimal ST depression, diffuse leads  Compared to ECG 2018 15:47:52  Myocardial infarct finding now present  ST (T wave) deviation now present  Left ventricular hypertrophy no longer present     Interpreted by me.    EKG  Interpreted by me  Time: 2:14 PM  Rhythm:  Normal sinus rhythm   Rate: 87  Axis: T waves anteriorly  Ectopy: none  Conduction: Abnormal repolarization inferiorly.  ST Segments: no acute change  T Waves: no acute change  Q Waves: none  Clinical Impression: LVH    RADIOLOGY/PROCEDURES  DX-CHEST-PORTABLE (1 VIEW)   Final Result         Patchy left basilar opacity, atelectasis or infection.           COURSE & MEDICAL DECISION MAKING  Pertinent Labs & Imaging studies reviewed. (See chart for details)    This is a 62-year-old male with what appears to be a pneumonia as well as ANA ROSA.  He has a low-grade temperature and may in fact have influenza as his spouse has influenza and is in the ICU.  The patient appears to have a history of extensive tobacco use and is on 3 L chronically.  His oxygen is normal on that now.  He was dosed with antibiotics and will be admitted for treatment of both ANA ROSA as well as pneumonia.    1:45 PM - Patient was seen and evaluated at bedside. Patient  will be treated with Unasyn 3 mg in 100 mL NS, azithromycin 500 mg, and LR. Ordered CXR, Blood Culture, Viral PCR, BNP, Troponin, CBC w/ diff, and CMP.    2:53 PM - Patient was reevaluated at bedside. Discussed lab and radiology results with the patient and/or family.  The plan for hospitalization was discussed. Patient and/or family was given the opportunity to ask any questions. Patient and/or family verbalizes understanding and agreement to this plan of care. Paged Hospitalist.    2:59 PM I discussed the patient's case and the above findings with Dr. Florence (Hospitalist) who agrees to evaluate the patient for hospitalization.     HYDRATION: Based on the patient's presentation of Dehydration the patient was given IV fluids. IV Hydration was used because oral hydration was not adequate alone. Upon recheck following hydration, the patient was improved.     DISPOSITION:  Patient will be hospitalized by Dr. Florence in guarded condition.     FINAL IMPRESSION  1.  ANA ROSA  2.  Pneumonia  3.         Electronically signed by: Dorian Cannon M.D., 11/26/2022 1:58 PM

## 2022-11-27 ENCOUNTER — APPOINTMENT (OUTPATIENT)
Dept: RADIOLOGY | Facility: MEDICAL CENTER | Age: 62
DRG: 683 | End: 2022-11-27
Attending: NURSE PRACTITIONER
Payer: COMMERCIAL

## 2022-11-27 PROBLEM — S44.00XA: Status: ACTIVE | Noted: 2022-11-27

## 2022-11-27 PROBLEM — S44.01XA INJURY OF RIGHT ULNAR NERVE AT UPPER ARM LEVEL: Status: ACTIVE | Noted: 2022-11-27

## 2022-11-27 PROBLEM — S44.01XA INJURY OF RIGHT ULNAR NERVE AT UPPER ARM LEVEL: Status: RESOLVED | Noted: 2022-11-27 | Resolved: 2022-11-27

## 2022-11-27 LAB
ANION GAP SERPL CALC-SCNC: 11 MMOL/L (ref 7–16)
ANION GAP SERPL CALC-SCNC: 9 MMOL/L (ref 7–16)
BUN SERPL-MCNC: 33 MG/DL (ref 8–22)
BUN SERPL-MCNC: 34 MG/DL (ref 8–22)
CALCIUM SERPL-MCNC: 7.7 MG/DL (ref 8.5–10.5)
CALCIUM SERPL-MCNC: 7.9 MG/DL (ref 8.5–10.5)
CHLORIDE SERPL-SCNC: 104 MMOL/L (ref 96–112)
CHLORIDE SERPL-SCNC: 106 MMOL/L (ref 96–112)
CO2 SERPL-SCNC: 18 MMOL/L (ref 20–33)
CO2 SERPL-SCNC: 20 MMOL/L (ref 20–33)
CREAT SERPL-MCNC: 1.39 MG/DL (ref 0.5–1.4)
CREAT SERPL-MCNC: 1.39 MG/DL (ref 0.5–1.4)
ERYTHROCYTE [DISTWIDTH] IN BLOOD BY AUTOMATED COUNT: 47.8 FL (ref 35.9–50)
ERYTHROCYTE [DISTWIDTH] IN BLOOD BY AUTOMATED COUNT: 47.9 FL (ref 35.9–50)
GFR SERPLBLD CREATININE-BSD FMLA CKD-EPI: 56 ML/MIN/1.73 M 2
GFR SERPLBLD CREATININE-BSD FMLA CKD-EPI: 56 ML/MIN/1.73 M 2
GLUCOSE SERPL-MCNC: 100 MG/DL (ref 65–99)
GLUCOSE SERPL-MCNC: 97 MG/DL (ref 65–99)
HCT VFR BLD AUTO: 32.2 % (ref 42–52)
HCT VFR BLD AUTO: 32.5 % (ref 42–52)
HGB BLD-MCNC: 10.5 G/DL (ref 14–18)
HGB BLD-MCNC: 10.8 G/DL (ref 14–18)
MAGNESIUM SERPL-MCNC: 1.8 MG/DL (ref 1.5–2.5)
MCH RBC QN AUTO: 29.2 PG (ref 27–33)
MCH RBC QN AUTO: 30.2 PG (ref 27–33)
MCHC RBC AUTO-ENTMCNC: 32.3 G/DL (ref 33.7–35.3)
MCHC RBC AUTO-ENTMCNC: 33.5 G/DL (ref 33.7–35.3)
MCV RBC AUTO: 89.9 FL (ref 81.4–97.8)
MCV RBC AUTO: 90.5 FL (ref 81.4–97.8)
PHOSPHATE SERPL-MCNC: 2.2 MG/DL (ref 2.5–4.5)
PLATELET # BLD AUTO: 158 K/UL (ref 164–446)
PLATELET # BLD AUTO: 163 K/UL (ref 164–446)
PMV BLD AUTO: 11.4 FL (ref 9–12.9)
PMV BLD AUTO: 11.5 FL (ref 9–12.9)
POTASSIUM SERPL-SCNC: 3.5 MMOL/L (ref 3.6–5.5)
POTASSIUM SERPL-SCNC: 3.6 MMOL/L (ref 3.6–5.5)
PROCALCITONIN SERPL-MCNC: 0.56 NG/ML
RBC # BLD AUTO: 3.58 M/UL (ref 4.7–6.1)
RBC # BLD AUTO: 3.59 M/UL (ref 4.7–6.1)
SODIUM SERPL-SCNC: 133 MMOL/L (ref 135–145)
SODIUM SERPL-SCNC: 135 MMOL/L (ref 135–145)
THYROPEROXIDASE AB SERPL-ACNC: 30 IU/ML (ref 0–9)
WBC # BLD AUTO: 8 K/UL (ref 4.8–10.8)
WBC # BLD AUTO: 8.3 K/UL (ref 4.8–10.8)

## 2022-11-27 PROCEDURE — A9270 NON-COVERED ITEM OR SERVICE: HCPCS | Performed by: HOSPITALIST

## 2022-11-27 PROCEDURE — A9270 NON-COVERED ITEM OR SERVICE: HCPCS | Performed by: NURSE PRACTITIONER

## 2022-11-27 PROCEDURE — 83735 ASSAY OF MAGNESIUM: CPT

## 2022-11-27 PROCEDURE — 700102 HCHG RX REV CODE 250 W/ 637 OVERRIDE(OP): Performed by: HOSPITALIST

## 2022-11-27 PROCEDURE — 85027 COMPLETE CBC AUTOMATED: CPT

## 2022-11-27 PROCEDURE — 94640 AIRWAY INHALATION TREATMENT: CPT

## 2022-11-27 PROCEDURE — 700105 HCHG RX REV CODE 258: Performed by: HOSPITALIST

## 2022-11-27 PROCEDURE — 700102 HCHG RX REV CODE 250 W/ 637 OVERRIDE(OP): Performed by: NURSE PRACTITIONER

## 2022-11-27 PROCEDURE — 700111 HCHG RX REV CODE 636 W/ 250 OVERRIDE (IP): Performed by: HOSPITALIST

## 2022-11-27 PROCEDURE — 97597 DBRDMT OPN WND 1ST 20 CM/<: CPT

## 2022-11-27 PROCEDURE — 80048 BASIC METABOLIC PNL TOTAL CA: CPT

## 2022-11-27 PROCEDURE — 76536 US EXAM OF HEAD AND NECK: CPT

## 2022-11-27 PROCEDURE — 700111 HCHG RX REV CODE 636 W/ 250 OVERRIDE (IP): Performed by: NURSE PRACTITIONER

## 2022-11-27 PROCEDURE — 86376 MICROSOMAL ANTIBODY EACH: CPT

## 2022-11-27 PROCEDURE — 770006 HCHG ROOM/CARE - MED/SURG/GYN SEMI*

## 2022-11-27 PROCEDURE — 84145 PROCALCITONIN (PCT): CPT

## 2022-11-27 PROCEDURE — 84100 ASSAY OF PHOSPHORUS: CPT

## 2022-11-27 PROCEDURE — 36415 COLL VENOUS BLD VENIPUNCTURE: CPT

## 2022-11-27 PROCEDURE — 73130 X-RAY EXAM OF HAND: CPT | Mod: RT

## 2022-11-27 PROCEDURE — 99233 SBSQ HOSP IP/OBS HIGH 50: CPT | Performed by: NURSE PRACTITIONER

## 2022-11-27 RX ORDER — GUAIFENESIN 200 MG/10ML
10 LIQUID ORAL EVERY 4 HOURS PRN
Status: DISCONTINUED | OUTPATIENT
Start: 2022-11-27 | End: 2022-11-29 | Stop reason: HOSPADM

## 2022-11-27 RX ORDER — LEVOTHYROXINE SODIUM 175 UG/1
350 TABLET ORAL
Status: DISCONTINUED | OUTPATIENT
Start: 2022-11-28 | End: 2022-11-29 | Stop reason: HOSPADM

## 2022-11-27 RX ORDER — ALBUTEROL SULFATE 90 UG/1
2 AEROSOL, METERED RESPIRATORY (INHALATION) EVERY 4 HOURS PRN
Status: DISCONTINUED | OUTPATIENT
Start: 2022-11-27 | End: 2022-11-29 | Stop reason: HOSPADM

## 2022-11-27 RX ORDER — POTASSIUM CHLORIDE 20 MEQ/1
40 TABLET, EXTENDED RELEASE ORAL ONCE
Status: COMPLETED | OUTPATIENT
Start: 2022-11-27 | End: 2022-11-27

## 2022-11-27 RX ORDER — OXYCODONE HYDROCHLORIDE 5 MG/1
5 TABLET ORAL EVERY 4 HOURS PRN
Status: DISCONTINUED | OUTPATIENT
Start: 2022-11-27 | End: 2022-11-29 | Stop reason: HOSPADM

## 2022-11-27 RX ADMIN — OMEPRAZOLE 20 MG: 20 CAPSULE, DELAYED RELEASE ORAL at 05:12

## 2022-11-27 RX ADMIN — ALBUTEROL SULFATE 2 PUFF: 90 AEROSOL, METERED RESPIRATORY (INHALATION) at 18:10

## 2022-11-27 RX ADMIN — HEPARIN SODIUM 5000 UNITS: 5000 INJECTION, SOLUTION INTRAVENOUS; SUBCUTANEOUS at 14:13

## 2022-11-27 RX ADMIN — SODIUM CHLORIDE: 9 INJECTION, SOLUTION INTRAVENOUS at 05:17

## 2022-11-27 RX ADMIN — TIOTROPIUM BROMIDE INHALATION SPRAY 5 MCG: 3.12 SPRAY, METERED RESPIRATORY (INHALATION) at 08:21

## 2022-11-27 RX ADMIN — TAMSULOSIN HYDROCHLORIDE 0.4 MG: 0.4 CAPSULE ORAL at 20:07

## 2022-11-27 RX ADMIN — ACETAMINOPHEN 650 MG: 325 TABLET, FILM COATED ORAL at 05:09

## 2022-11-27 RX ADMIN — CARVEDILOL 6.25 MG: 6.25 TABLET, FILM COATED ORAL at 08:55

## 2022-11-27 RX ADMIN — BUDESONIDE AND FORMOTEROL FUMARATE DIHYDRATE 2 PUFF: 160; 4.5 AEROSOL RESPIRATORY (INHALATION) at 08:22

## 2022-11-27 RX ADMIN — HEPARIN SODIUM 5000 UNITS: 5000 INJECTION, SOLUTION INTRAVENOUS; SUBCUTANEOUS at 05:10

## 2022-11-27 RX ADMIN — CARVEDILOL 6.25 MG: 6.25 TABLET, FILM COATED ORAL at 17:43

## 2022-11-27 RX ADMIN — OXYCODONE 5 MG: 5 TABLET ORAL at 17:43

## 2022-11-27 RX ADMIN — HEPARIN SODIUM 5000 UNITS: 5000 INJECTION, SOLUTION INTRAVENOUS; SUBCUTANEOUS at 20:08

## 2022-11-27 RX ADMIN — BUDESONIDE AND FORMOTEROL FUMARATE DIHYDRATE 2 PUFF: 160; 4.5 AEROSOL RESPIRATORY (INHALATION) at 19:25

## 2022-11-27 RX ADMIN — LEVOTHYROXINE SODIUM 300 MCG: 0.1 TABLET ORAL at 05:11

## 2022-11-27 RX ADMIN — SENNOSIDES AND DOCUSATE SODIUM 2 TABLET: 50; 8.6 TABLET ORAL at 05:11

## 2022-11-27 RX ADMIN — FOLIC ACID 1 MG: 1 TABLET ORAL at 05:10

## 2022-11-27 RX ADMIN — POTASSIUM CHLORIDE 40 MEQ: 1500 TABLET, EXTENDED RELEASE ORAL at 11:26

## 2022-11-27 RX ADMIN — OMEPRAZOLE 20 MG: 20 CAPSULE, DELAYED RELEASE ORAL at 17:43

## 2022-11-27 RX ADMIN — GUAIFENESIN 200 MG: 100 SOLUTION ORAL at 20:07

## 2022-11-27 RX ADMIN — CEFTRIAXONE SODIUM 1000 MG: 10 INJECTION, POWDER, FOR SOLUTION INTRAVENOUS at 17:43

## 2022-11-27 ASSESSMENT — COGNITIVE AND FUNCTIONAL STATUS - GENERAL
SUGGESTED CMS G CODE MODIFIER DAILY ACTIVITY: CH
DAILY ACTIVITIY SCORE: 24
CLIMB 3 TO 5 STEPS WITH RAILING: A LITTLE
MOBILITY SCORE: 20
SUGGESTED CMS G CODE MODIFIER MOBILITY: CJ
STANDING UP FROM CHAIR USING ARMS: A LITTLE
WALKING IN HOSPITAL ROOM: A LITTLE
MOVING FROM LYING ON BACK TO SITTING ON SIDE OF FLAT BED: A LITTLE

## 2022-11-27 ASSESSMENT — ENCOUNTER SYMPTOMS
FEVER: 0
CHILLS: 0
PALPITATIONS: 0
MYALGIAS: 0
ABDOMINAL PAIN: 0
WHEEZING: 0
HEARTBURN: 0
BLURRED VISION: 0
NERVOUS/ANXIOUS: 0
DEPRESSION: 0
COUGH: 1
DIZZINESS: 0
DOUBLE VISION: 0
SHORTNESS OF BREATH: 1
WEAKNESS: 0
SPUTUM PRODUCTION: 0

## 2022-11-27 ASSESSMENT — PAIN DESCRIPTION - PAIN TYPE: TYPE: ACUTE PAIN

## 2022-11-27 NOTE — CARE PLAN
The patient is stable at this time          Progress made toward(s) clinical / shift goals: on going    Patient is  progressing towards the following goals:       Simple: Patient demonstrates quick and easy understanding/Verbalized Understanding
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
## 2022-11-27 NOTE — CARE PLAN
The patient is Stable - Low risk of patient condition declining or worsening         Progress made toward(s) clinical / shift goals:  Oriented to unit, discussed plan of care with patient, reminded to call when needing help      Problem: Knowledge Deficit - Standard  Goal: Patient and family/care givers will demonstrate understanding of plan of care, disease process/condition, diagnostic tests and medications  Outcome: Progressing     Problem: Pain - Standard  Goal: Alleviation of pain or a reduction in pain to the patient’s comfort goal  Outcome: Progressing     Problem: Skin Integrity  Goal: Skin integrity is maintained or improved  Outcome: Progressing       Patient is not progressing towards the following goals:

## 2022-11-27 NOTE — PROGRESS NOTES
4 Eyes Skin Assessment Completed by JULITA Short and JULITA Hardin.    Head WDL  Ears WDL  Nose WDL  Mouth WDL  Neck WDL  Breast/Chest WDL  Shoulder Blades WDL  Spine WDL  (R) Arm/Elbow/Hand Redness, Abrasion, Discoloration, Scar, and Edema  (L) Arm/Elbow/Hand Abrasion  Abdomen WDL  Groin WDL  Scrotum/Coccyx/Buttocks WDL  (R) Leg WDL  (L) Leg WDL  (R) Heel/Foot/Toe WDL  (L) Heel/Foot/Toe WDL          Devices In Places Nasal Cannula      Interventions In Place Gray Ear Foams    Possible Skin Injury Yes    Pictures Uploaded Into Epic No, needs to be completed  Wound Consult Placed Yes  RN Wound Prevention Protocol Ordered No

## 2022-11-27 NOTE — ASSESSMENT & PLAN NOTE
TSH of 90 back in 2018  Patient follows up with his primary care doctor and takes his medication as prescribed  Resume home Synthroid, on 300 mcg daily  TSH 87.2 and free T4 0.5  TPO 30  Thyroid US pending  After discussion with pharmacy, given already high dose of Synthroid will increase to 350 mcg daily and advise close follow-up in 6 weeks for TSH recheck

## 2022-11-27 NOTE — ASSESSMENT & PLAN NOTE
Normalized after fluids  Monitor BMP and assess response  Avoid IV contrast/nephrotoxins/NSAIDs  Dose adjust meds for decreased GFR  Monitor

## 2022-11-27 NOTE — CARE PLAN
The patient is Stable - Low risk of patient condition declining or worsening    Shift Goals  Clinical Goals: Pain management, Safety  Patient Goals: Pain management, Rest    Progress made toward(s) clinical / shift goals:  Discussed plan of care with patient. Pain improving, reminded to call for help when needing to get out of bed, and reposition often.      Problem: Knowledge Deficit - Standard  Goal: Patient and family/care givers will demonstrate understanding of plan of care, disease process/condition, diagnostic tests and medications  Outcome: Progressing     Problem: Pain - Standard  Goal: Alleviation of pain or a reduction in pain to the patient’s comfort goal  Outcome: Progressing     Problem: Skin Integrity  Goal: Skin integrity is maintained or improved  Outcome: Progressing       Patient is not progressing towards the following goals:
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
## 2022-11-27 NOTE — ASSESSMENT & PLAN NOTE
Wheezing on exam  Resume home inhalers  COPD navigator following  RT protocol with duonebs  Prednisone 40 mg PO x 5 days
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
## 2022-11-27 NOTE — PROGRESS NOTES
Received bedside report from RN , pt care assumed. VS stable at this time pt assessment complete. Pt A&Ox 4  c/o 6/10 pain at this time. POC discussed with pt and verbalizes no questions. Pt denies any additional needs at this time. Bed locked and in lowest position, bed alarm . Pt educated on fall risk and verbalized understanding, call light within reach, hourly rounding initiated.  patient medicated for pain with good effect

## 2022-11-28 PROBLEM — J10.1 INFLUENZA A: Status: ACTIVE | Noted: 2022-11-28

## 2022-11-28 PROBLEM — D64.9 NORMOCYTIC ANEMIA: Status: ACTIVE | Noted: 2022-11-28

## 2022-11-28 LAB
ANION GAP SERPL CALC-SCNC: 11 MMOL/L (ref 7–16)
B PARAP IS1001 DNA NPH QL NAA+NON-PROBE: NOT DETECTED
B PERT.PT PRMT NPH QL NAA+NON-PROBE: NOT DETECTED
BUN SERPL-MCNC: 23 MG/DL (ref 8–22)
C PNEUM DNA NPH QL NAA+NON-PROBE: NOT DETECTED
CALCIUM SERPL-MCNC: 7.9 MG/DL (ref 8.5–10.5)
CHLORIDE SERPL-SCNC: 107 MMOL/L (ref 96–112)
CO2 SERPL-SCNC: 20 MMOL/L (ref 20–33)
CREAT SERPL-MCNC: 1.17 MG/DL (ref 0.5–1.4)
CRP SERPL HS-MCNC: 10.39 MG/DL (ref 0–0.75)
ERYTHROCYTE [DISTWIDTH] IN BLOOD BY AUTOMATED COUNT: 48 FL (ref 35.9–50)
ERYTHROCYTE [SEDIMENTATION RATE] IN BLOOD BY WESTERGREN METHOD: 35 MM/HOUR (ref 0–20)
FERRITIN SERPL-MCNC: 366 NG/ML (ref 22–322)
FLUAV H3 RNA NPH QL NAA+NON-PROBE: DETECTED
FLUBV RNA NPH QL NAA+NON-PROBE: NOT DETECTED
FOLATE SERPL-MCNC: 27.9 NG/ML
GFR SERPLBLD CREATININE-BSD FMLA CKD-EPI: 69 ML/MIN/1.73 M 2
GLUCOSE SERPL-MCNC: 93 MG/DL (ref 65–99)
HADV DNA NPH QL NAA+NON-PROBE: NOT DETECTED
HCOV 229E RNA NPH QL NAA+NON-PROBE: NOT DETECTED
HCOV HKU1 RNA NPH QL NAA+NON-PROBE: NOT DETECTED
HCOV NL63 RNA NPH QL NAA+NON-PROBE: NOT DETECTED
HCOV OC43 RNA NPH QL NAA+NON-PROBE: NOT DETECTED
HCT VFR BLD AUTO: 31.9 % (ref 42–52)
HGB BLD-MCNC: 10.5 G/DL (ref 14–18)
HMPV RNA NPH QL NAA+NON-PROBE: NOT DETECTED
HPIV1 RNA NPH QL NAA+NON-PROBE: NOT DETECTED
HPIV2 RNA NPH QL NAA+NON-PROBE: NOT DETECTED
HPIV3 RNA NPH QL NAA+NON-PROBE: NOT DETECTED
HPIV4 RNA NPH QL NAA+NON-PROBE: NOT DETECTED
IRON SATN MFR SERPL: 7 % (ref 15–55)
IRON SERPL-MCNC: 11 UG/DL (ref 50–180)
M PNEUMO DNA NPH QL NAA+NON-PROBE: NOT DETECTED
MAGNESIUM SERPL-MCNC: 1.7 MG/DL (ref 1.5–2.5)
MCH RBC QN AUTO: 29.7 PG (ref 27–33)
MCHC RBC AUTO-ENTMCNC: 32.9 G/DL (ref 33.7–35.3)
MCV RBC AUTO: 90.4 FL (ref 81.4–97.8)
PHOSPHATE SERPL-MCNC: 1.7 MG/DL (ref 2.5–4.5)
PLATELET # BLD AUTO: 159 K/UL (ref 164–446)
PMV BLD AUTO: 11.2 FL (ref 9–12.9)
POTASSIUM SERPL-SCNC: 4.3 MMOL/L (ref 3.6–5.5)
RBC # BLD AUTO: 3.53 M/UL (ref 4.7–6.1)
RSV RNA NPH QL NAA+NON-PROBE: NOT DETECTED
RV+EV RNA NPH QL NAA+NON-PROBE: NOT DETECTED
SARS-COV-2 RNA NPH QL NAA+NON-PROBE: NOTDETECTED
SODIUM SERPL-SCNC: 138 MMOL/L (ref 135–145)
TIBC SERPL-MCNC: 169 UG/DL (ref 250–450)
UIBC SERPL-MCNC: 158 UG/DL (ref 110–370)
VIT B12 SERPL-MCNC: 771 PG/ML (ref 211–911)
WBC # BLD AUTO: 6.2 K/UL (ref 4.8–10.8)

## 2022-11-28 PROCEDURE — 83735 ASSAY OF MAGNESIUM: CPT

## 2022-11-28 PROCEDURE — 700111 HCHG RX REV CODE 636 W/ 250 OVERRIDE (IP): Performed by: HOSPITALIST

## 2022-11-28 PROCEDURE — 700102 HCHG RX REV CODE 250 W/ 637 OVERRIDE(OP): Performed by: HOSPITALIST

## 2022-11-28 PROCEDURE — 82607 VITAMIN B-12: CPT

## 2022-11-28 PROCEDURE — 83550 IRON BINDING TEST: CPT

## 2022-11-28 PROCEDURE — 94640 AIRWAY INHALATION TREATMENT: CPT

## 2022-11-28 PROCEDURE — 87581 M.PNEUMON DNA AMP PROBE: CPT

## 2022-11-28 PROCEDURE — 99232 SBSQ HOSP IP/OBS MODERATE 35: CPT | Performed by: NURSE PRACTITIONER

## 2022-11-28 PROCEDURE — 86140 C-REACTIVE PROTEIN: CPT

## 2022-11-28 PROCEDURE — 700111 HCHG RX REV CODE 636 W/ 250 OVERRIDE (IP): Performed by: NURSE PRACTITIONER

## 2022-11-28 PROCEDURE — 84100 ASSAY OF PHOSPHORUS: CPT

## 2022-11-28 PROCEDURE — 36415 COLL VENOUS BLD VENIPUNCTURE: CPT

## 2022-11-28 PROCEDURE — 85652 RBC SED RATE AUTOMATED: CPT

## 2022-11-28 PROCEDURE — 82728 ASSAY OF FERRITIN: CPT

## 2022-11-28 PROCEDURE — 302043 TAPE, HYPAFIX: Performed by: INTERNAL MEDICINE

## 2022-11-28 PROCEDURE — A9270 NON-COVERED ITEM OR SERVICE: HCPCS | Performed by: HOSPITALIST

## 2022-11-28 PROCEDURE — 99406 BEHAV CHNG SMOKING 3-10 MIN: CPT

## 2022-11-28 PROCEDURE — 83540 ASSAY OF IRON: CPT

## 2022-11-28 PROCEDURE — 82746 ASSAY OF FOLIC ACID SERUM: CPT

## 2022-11-28 PROCEDURE — 87486 CHLMYD PNEUM DNA AMP PROBE: CPT

## 2022-11-28 PROCEDURE — 85027 COMPLETE CBC AUTOMATED: CPT

## 2022-11-28 PROCEDURE — A9270 NON-COVERED ITEM OR SERVICE: HCPCS | Performed by: NURSE PRACTITIONER

## 2022-11-28 PROCEDURE — 80048 BASIC METABOLIC PNL TOTAL CA: CPT

## 2022-11-28 PROCEDURE — 700101 HCHG RX REV CODE 250: Performed by: NURSE PRACTITIONER

## 2022-11-28 PROCEDURE — 700102 HCHG RX REV CODE 250 W/ 637 OVERRIDE(OP): Performed by: NURSE PRACTITIONER

## 2022-11-28 PROCEDURE — 94664 DEMO&/EVAL PT USE INHALER: CPT

## 2022-11-28 PROCEDURE — 87633 RESP VIRUS 12-25 TARGETS: CPT

## 2022-11-28 PROCEDURE — 770001 HCHG ROOM/CARE - MED/SURG/GYN PRIV*

## 2022-11-28 PROCEDURE — 306000 HYDROGEL 1.0 OZ: Performed by: INTERNAL MEDICINE

## 2022-11-28 PROCEDURE — 94669 MECHANICAL CHEST WALL OSCILL: CPT

## 2022-11-28 PROCEDURE — 87798 DETECT AGENT NOS DNA AMP: CPT

## 2022-11-28 RX ORDER — FERROUS GLUCONATE 324(38)MG
324 TABLET ORAL
Status: DISCONTINUED | OUTPATIENT
Start: 2022-11-28 | End: 2022-11-29 | Stop reason: HOSPADM

## 2022-11-28 RX ORDER — AMOXICILLIN AND CLAVULANATE POTASSIUM 875; 125 MG/1; MG/1
1 TABLET, FILM COATED ORAL EVERY 12 HOURS
Status: DISCONTINUED | OUTPATIENT
Start: 2022-11-28 | End: 2022-11-29 | Stop reason: HOSPADM

## 2022-11-28 RX ORDER — OSELTAMIVIR PHOSPHATE 30 MG/1
30 CAPSULE ORAL 2 TIMES DAILY
Status: DISCONTINUED | OUTPATIENT
Start: 2022-11-28 | End: 2022-11-29

## 2022-11-28 RX ORDER — MAGNESIUM SULFATE HEPTAHYDRATE 40 MG/ML
2 INJECTION, SOLUTION INTRAVENOUS ONCE
Status: COMPLETED | OUTPATIENT
Start: 2022-11-28 | End: 2022-11-28

## 2022-11-28 RX ORDER — PREDNISONE 20 MG/1
40 TABLET ORAL DAILY
Status: DISCONTINUED | OUTPATIENT
Start: 2022-11-28 | End: 2022-11-29 | Stop reason: HOSPADM

## 2022-11-28 RX ORDER — IPRATROPIUM BROMIDE AND ALBUTEROL SULFATE 2.5; .5 MG/3ML; MG/3ML
3 SOLUTION RESPIRATORY (INHALATION)
Status: DISCONTINUED | OUTPATIENT
Start: 2022-11-28 | End: 2022-11-28

## 2022-11-28 RX ADMIN — CARVEDILOL 6.25 MG: 6.25 TABLET, FILM COATED ORAL at 17:43

## 2022-11-28 RX ADMIN — HEPARIN SODIUM 5000 UNITS: 5000 INJECTION, SOLUTION INTRAVENOUS; SUBCUTANEOUS at 21:52

## 2022-11-28 RX ADMIN — TAMSULOSIN HYDROCHLORIDE 0.4 MG: 0.4 CAPSULE ORAL at 19:39

## 2022-11-28 RX ADMIN — AMOXICILLIN AND CLAVULANATE POTASSIUM 1 TABLET: 875; 125 TABLET, FILM COATED ORAL at 17:43

## 2022-11-28 RX ADMIN — IPRATROPIUM BROMIDE AND ALBUTEROL SULFATE 3 ML: .5; 2.5 SOLUTION RESPIRATORY (INHALATION) at 10:27

## 2022-11-28 RX ADMIN — OSELTAMIVIR PHOSPHATE 30 MG: 30 CAPSULE ORAL at 12:36

## 2022-11-28 RX ADMIN — OXYCODONE 5 MG: 5 TABLET ORAL at 19:39

## 2022-11-28 RX ADMIN — GUAIFENESIN 200 MG: 100 SOLUTION ORAL at 00:53

## 2022-11-28 RX ADMIN — CARVEDILOL 6.25 MG: 6.25 TABLET, FILM COATED ORAL at 09:01

## 2022-11-28 RX ADMIN — PREDNISONE 40 MG: 20 TABLET ORAL at 09:01

## 2022-11-28 RX ADMIN — HEPARIN SODIUM 5000 UNITS: 5000 INJECTION, SOLUTION INTRAVENOUS; SUBCUTANEOUS at 14:27

## 2022-11-28 RX ADMIN — LEVOTHYROXINE SODIUM 350 MCG: 0.17 TABLET ORAL at 05:22

## 2022-11-28 RX ADMIN — POTASSIUM PHOSPHATE, MONOBASIC AND POTASSIUM PHOSPHATE, DIBASIC 30 MMOL: 224; 236 INJECTION, SOLUTION, CONCENTRATE INTRAVENOUS at 15:12

## 2022-11-28 RX ADMIN — BUDESONIDE AND FORMOTEROL FUMARATE DIHYDRATE 2 PUFF: 160; 4.5 AEROSOL RESPIRATORY (INHALATION) at 18:47

## 2022-11-28 RX ADMIN — FERROUS GLUCONATE 324 MG: 324 TABLET ORAL at 15:10

## 2022-11-28 RX ADMIN — OMEPRAZOLE 20 MG: 20 CAPSULE, DELAYED RELEASE ORAL at 17:43

## 2022-11-28 RX ADMIN — BUDESONIDE AND FORMOTEROL FUMARATE DIHYDRATE 2 PUFF: 160; 4.5 AEROSOL RESPIRATORY (INHALATION) at 07:41

## 2022-11-28 RX ADMIN — ATORVASTATIN CALCIUM 80 MG: 80 TABLET, FILM COATED ORAL at 19:38

## 2022-11-28 RX ADMIN — HEPARIN SODIUM 5000 UNITS: 5000 INJECTION, SOLUTION INTRAVENOUS; SUBCUTANEOUS at 04:51

## 2022-11-28 RX ADMIN — OXYCODONE 5 MG: 5 TABLET ORAL at 15:10

## 2022-11-28 RX ADMIN — FOLIC ACID 1 MG: 1 TABLET ORAL at 04:51

## 2022-11-28 RX ADMIN — OMEPRAZOLE 20 MG: 20 CAPSULE, DELAYED RELEASE ORAL at 04:50

## 2022-11-28 RX ADMIN — MAGNESIUM SULFATE HEPTAHYDRATE 2 G: 40 INJECTION, SOLUTION INTRAVENOUS at 21:54

## 2022-11-28 RX ADMIN — GUAIFENESIN 200 MG: 100 SOLUTION ORAL at 04:51

## 2022-11-28 RX ADMIN — TIOTROPIUM BROMIDE INHALATION SPRAY 5 MCG: 3.12 SPRAY, METERED RESPIRATORY (INHALATION) at 07:41

## 2022-11-28 ASSESSMENT — ENCOUNTER SYMPTOMS
DOUBLE VISION: 0
DEPRESSION: 0
BLURRED VISION: 0
PALPITATIONS: 0
DIZZINESS: 0
WEAKNESS: 0
ABDOMINAL PAIN: 0
WHEEZING: 0
SHORTNESS OF BREATH: 1
HEARTBURN: 0
CHILLS: 0
FEVER: 0
SPUTUM PRODUCTION: 0
NERVOUS/ANXIOUS: 0
COUGH: 1
MYALGIAS: 0

## 2022-11-28 ASSESSMENT — PAIN DESCRIPTION - PAIN TYPE
TYPE: ACUTE PAIN

## 2022-11-28 ASSESSMENT — PATIENT HEALTH QUESTIONNAIRE - PHQ9
2. FEELING DOWN, DEPRESSED, IRRITABLE, OR HOPELESS: NOT AT ALL
SUM OF ALL RESPONSES TO PHQ9 QUESTIONS 1 AND 2: 0
1. LITTLE INTEREST OR PLEASURE IN DOING THINGS: NOT AT ALL

## 2022-11-28 NOTE — PROGRESS NOTES
Report given to Bing CANCINO, pt transferred to S169 by transport. On droplet precautions for flu. All pt belongings taken from bedside. Tamiflu x1 dose given prior to pt going downstairs.

## 2022-11-28 NOTE — CARE PLAN
The patient is Stable - Low risk of patient condition declining or worsening    Shift Goals  Clinical Goals: Improve cough, wean o2 tolerating  Patient Goals: Improve oxygenation    Progress made toward(s) clinical / shift goals:  yes    Problem: Knowledge Deficit - Standard  Goal: Patient and family/care givers will demonstrate understanding of plan of care, disease process/condition, diagnostic tests and medications  Outcome: Progressing     Problem: Pain - Standard  Goal: Alleviation of pain or a reduction in pain to the patient’s comfort goal  Outcome: Progressing     Problem: Skin Integrity  Goal: Skin integrity is maintained or improved  Outcome: Progressing       Patient is not progressing towards the following goals:

## 2022-11-28 NOTE — PROGRESS NOTES
No acute events overnight. Began to wean pt to 3L NC, maintains o2 sats >90%. Currently denies pain, but reports sob with exertion. Denies n/v, or chest pains. Pt has rhonchi and expiratory wheezing noted. APRN started prednisone for his breathing and wanted to swab patient for covid/flu/rsv. Dressing to R hand CDI. Safety maintained, call light and hydration within reach.

## 2022-11-28 NOTE — CARE PLAN
Problem: Knowledge Deficit - Standard  Goal: Patient and family/care givers will demonstrate understanding of plan of care, disease process/condition, diagnostic tests and medications  Outcome: Progressing     Problem: Pain - Standard  Goal: Alleviation of pain or a reduction in pain to the patient’s comfort goal  Outcome: Progressing     Problem: Skin Integrity  Goal: Skin integrity is maintained or improved  Outcome: Progressing   The patient is Stable - Low risk of patient condition declining or worsening    Shift Goals  Clinical Goals: safety, improve cough  Patient Goals: improve cough
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
## 2022-11-28 NOTE — CARE PLAN
The patient is Stable - Low risk of patient condition declining or worsening    Shift Goals  Clinical Goals: pain management  Patient Goals: no pain    Progress made toward(s) clinical / shift goals:    Problem: Knowledge Deficit - Standard  Goal: Patient and family/care givers will demonstrate understanding of plan of care, disease process/condition, diagnostic tests and medications  Outcome: Progressing  Note: Pt and family educated on POC, all questions answered in regards to disease process, treatment and diet. Pt and family verbalize understanding and voice no further questions at this time.      Problem: Pain - Standard  Goal: Alleviation of pain or a reduction in pain to the patient’s comfort goal  Outcome: Progressing  Flowsheets (Taken 11/28/2022 7463)  Pain Rating Scale (NPRS): 8  Note: Pain noted by patient will decrease as evidenced by increased tolerance to mobility by end of shift. Pain managed with oxycodone every 4 hours.        Patient is not progressing towards the following goals:

## 2022-11-28 NOTE — ASSESSMENT & PLAN NOTE
Hgb 10.5  Iron studies consistent with mixed AKHIL/ACD   B12/folate pending  MD iron replacement protocol ordered, unfortunately there is a shortage  Will be prescribed oral iron replacement

## 2022-11-28 NOTE — PROGRESS NOTES
Report received, pt care assumed, tele box n/a. VSS, pt assessment complete. Pt a/o x4. pt states pain is 10/10, will receive medication prn. no signs of distress noted at this time. POC discussed with pt and verbalizes no questions and is in agreement with treatment. Bed in lowest position, bed alarm on, pt educated on fall risk and call light within reach.

## 2022-11-28 NOTE — DOCUMENTATION QUERY
[facility]                                                                       Query Response Note      PATIENT:               MARY OH  ACCT #:                  [id number]  MRN:                     [id number]  :                      1960  ADMIT DATE:       2022 1:16 PM  DISCH DATE:          RESPONDING  PROVIDER #:        [id number]           QUERY TEXT:    Pt has documented pneumonia noted in the ER provider notes. Please clarify status of this condition:       The patient's Clinical Indicators include:  Findings:  --Per ER provider notes on , ''patient will be admitted for treatment of both ANA ROSA as well as pneumonia''      stated  --Chest xray from  revealed:  ''patchy left basilar opacity, blunting of right costophrenic angle. Patchy      left basilar opacity, atelectasis or infection''  --Per H&P, ''patient brought in as code assist from T6 after sudden onset SOB and cyanosis'' stated    Treatment:  --Supplemental O2  --Albuterol 2.5mg/0.5ml nebulizer solution 2.5mg 4 times daily and every 2 hours PRN SOB/wheezing  --Symbicort 160-4.5mcg/ACT inhaler 2 puff 2 times daily  --Rocephin 1,000mg IV at 200ml/hr every 24 hours    Risk Factors:  --Chronic hypoxic respiratory failure  --COPD  --HTN  --Current smoker on home O2    Thank you,  Alize Ernst RN, BSN  Clinical   Connect via ALICE App  Options provided:   -- Pneumonia is ruled in   -- Pneumonia is  ruled out   -- Other explanation, please specify   -- Unable to determine      Query created by: Alize Ernst on 2022 1:02 PM    RESPONSE TEXT:    other explanation- Negative procal, found to be influenza positive          Electronically signed by:  HUSSEIN NELSON 2022 1:27 PM

## 2022-11-29 VITALS
TEMPERATURE: 98.3 F | OXYGEN SATURATION: 98 % | DIASTOLIC BLOOD PRESSURE: 82 MMHG | BODY MASS INDEX: 18.72 KG/M2 | WEIGHT: 119.27 LBS | SYSTOLIC BLOOD PRESSURE: 142 MMHG | HEART RATE: 79 BPM | HEIGHT: 67 IN | RESPIRATION RATE: 18 BRPM

## 2022-11-29 LAB
ANION GAP SERPL CALC-SCNC: 11 MMOL/L (ref 7–16)
BUN SERPL-MCNC: 23 MG/DL (ref 8–22)
CALCIUM SERPL-MCNC: 8.1 MG/DL (ref 8.5–10.5)
CHLORIDE SERPL-SCNC: 104 MMOL/L (ref 96–112)
CO2 SERPL-SCNC: 18 MMOL/L (ref 20–33)
CREAT SERPL-MCNC: 0.96 MG/DL (ref 0.5–1.4)
ERYTHROCYTE [DISTWIDTH] IN BLOOD BY AUTOMATED COUNT: 47.3 FL (ref 35.9–50)
GFR SERPLBLD CREATININE-BSD FMLA CKD-EPI: 88 ML/MIN/1.73 M 2
GLUCOSE SERPL-MCNC: 134 MG/DL (ref 65–99)
HCT VFR BLD AUTO: 31.4 % (ref 42–52)
HGB BLD-MCNC: 10.5 G/DL (ref 14–18)
MAGNESIUM SERPL-MCNC: 2 MG/DL (ref 1.5–2.5)
MCH RBC QN AUTO: 30.1 PG (ref 27–33)
MCHC RBC AUTO-ENTMCNC: 33.4 G/DL (ref 33.7–35.3)
MCV RBC AUTO: 90 FL (ref 81.4–97.8)
PHOSPHATE SERPL-MCNC: 2.8 MG/DL (ref 2.5–4.5)
PLATELET # BLD AUTO: 166 K/UL (ref 164–446)
PMV BLD AUTO: 11.2 FL (ref 9–12.9)
POTASSIUM SERPL-SCNC: 5 MMOL/L (ref 3.6–5.5)
RBC # BLD AUTO: 3.49 M/UL (ref 4.7–6.1)
SODIUM SERPL-SCNC: 133 MMOL/L (ref 135–145)
WBC # BLD AUTO: 4.1 K/UL (ref 4.8–10.8)

## 2022-11-29 PROCEDURE — 700111 HCHG RX REV CODE 636 W/ 250 OVERRIDE (IP): Performed by: NURSE PRACTITIONER

## 2022-11-29 PROCEDURE — A9270 NON-COVERED ITEM OR SERVICE: HCPCS | Performed by: HOSPITALIST

## 2022-11-29 PROCEDURE — A9270 NON-COVERED ITEM OR SERVICE: HCPCS | Performed by: NURSE PRACTITIONER

## 2022-11-29 PROCEDURE — 99239 HOSP IP/OBS DSCHRG MGMT >30: CPT | Mod: GC | Performed by: INTERNAL MEDICINE

## 2022-11-29 PROCEDURE — 94640 AIRWAY INHALATION TREATMENT: CPT

## 2022-11-29 PROCEDURE — 85027 COMPLETE CBC AUTOMATED: CPT

## 2022-11-29 PROCEDURE — 36415 COLL VENOUS BLD VENIPUNCTURE: CPT

## 2022-11-29 PROCEDURE — 700102 HCHG RX REV CODE 250 W/ 637 OVERRIDE(OP): Performed by: INTERNAL MEDICINE

## 2022-11-29 PROCEDURE — 700102 HCHG RX REV CODE 250 W/ 637 OVERRIDE(OP): Performed by: NURSE PRACTITIONER

## 2022-11-29 PROCEDURE — 84100 ASSAY OF PHOSPHORUS: CPT

## 2022-11-29 PROCEDURE — A9270 NON-COVERED ITEM OR SERVICE: HCPCS | Performed by: INTERNAL MEDICINE

## 2022-11-29 PROCEDURE — 700102 HCHG RX REV CODE 250 W/ 637 OVERRIDE(OP): Performed by: HOSPITALIST

## 2022-11-29 PROCEDURE — 83735 ASSAY OF MAGNESIUM: CPT

## 2022-11-29 PROCEDURE — 700111 HCHG RX REV CODE 636 W/ 250 OVERRIDE (IP): Performed by: HOSPITALIST

## 2022-11-29 PROCEDURE — 80048 BASIC METABOLIC PNL TOTAL CA: CPT

## 2022-11-29 RX ORDER — OSELTAMIVIR PHOSPHATE 75 MG/1
75 CAPSULE ORAL 2 TIMES DAILY
Qty: 9 CAPSULE | Refills: 0 | Status: SHIPPED | OUTPATIENT
Start: 2022-11-29 | End: 2022-12-03

## 2022-11-29 RX ORDER — OSELTAMIVIR PHOSPHATE 75 MG/1
75 CAPSULE ORAL 2 TIMES DAILY
Status: DISCONTINUED | OUTPATIENT
Start: 2022-11-29 | End: 2022-11-29 | Stop reason: HOSPADM

## 2022-11-29 RX ORDER — PREDNISONE 20 MG/1
40 TABLET ORAL EVERY MORNING
Qty: 6 TABLET | Refills: 0 | Status: SHIPPED | OUTPATIENT
Start: 2022-11-30 | End: 2022-12-03

## 2022-11-29 RX ORDER — FERROUS GLUCONATE 324(38)MG
324 TABLET ORAL
Qty: 30 TABLET | Refills: 1 | Status: SHIPPED | OUTPATIENT
Start: 2022-11-30 | End: 2022-12-30

## 2022-11-29 RX ORDER — AMOXICILLIN AND CLAVULANATE POTASSIUM 875; 125 MG/1; MG/1
1 TABLET, FILM COATED ORAL EVERY 12 HOURS
Qty: 3 TABLET | Refills: 0 | Status: SHIPPED | OUTPATIENT
Start: 2022-11-29 | End: 2022-11-30

## 2022-11-29 RX ADMIN — OMEPRAZOLE 20 MG: 20 CAPSULE, DELAYED RELEASE ORAL at 17:15

## 2022-11-29 RX ADMIN — AMOXICILLIN AND CLAVULANATE POTASSIUM 1 TABLET: 875; 125 TABLET, FILM COATED ORAL at 04:39

## 2022-11-29 RX ADMIN — OMEPRAZOLE 20 MG: 20 CAPSULE, DELAYED RELEASE ORAL at 04:38

## 2022-11-29 RX ADMIN — TAMSULOSIN HYDROCHLORIDE 0.4 MG: 0.4 CAPSULE ORAL at 19:58

## 2022-11-29 RX ADMIN — CARVEDILOL 6.25 MG: 6.25 TABLET, FILM COATED ORAL at 08:14

## 2022-11-29 RX ADMIN — BUDESONIDE AND FORMOTEROL FUMARATE DIHYDRATE 2 PUFF: 160; 4.5 AEROSOL RESPIRATORY (INHALATION) at 07:37

## 2022-11-29 RX ADMIN — LEVOTHYROXINE SODIUM 350 MCG: 0.17 TABLET ORAL at 04:39

## 2022-11-29 RX ADMIN — ATORVASTATIN CALCIUM 80 MG: 80 TABLET, FILM COATED ORAL at 19:58

## 2022-11-29 RX ADMIN — CARVEDILOL 6.25 MG: 6.25 TABLET, FILM COATED ORAL at 17:15

## 2022-11-29 RX ADMIN — AMOXICILLIN AND CLAVULANATE POTASSIUM 1 TABLET: 875; 125 TABLET, FILM COATED ORAL at 17:15

## 2022-11-29 RX ADMIN — BUDESONIDE AND FORMOTEROL FUMARATE DIHYDRATE 2 PUFF: 160; 4.5 AEROSOL RESPIRATORY (INHALATION) at 19:58

## 2022-11-29 RX ADMIN — TIOTROPIUM BROMIDE INHALATION SPRAY 5 MCG: 3.12 SPRAY, METERED RESPIRATORY (INHALATION) at 07:37

## 2022-11-29 RX ADMIN — FOLIC ACID 1 MG: 1 TABLET ORAL at 04:38

## 2022-11-29 RX ADMIN — HEPARIN SODIUM 5000 UNITS: 5000 INJECTION, SOLUTION INTRAVENOUS; SUBCUTANEOUS at 04:38

## 2022-11-29 RX ADMIN — PREDNISONE 40 MG: 20 TABLET ORAL at 04:39

## 2022-11-29 RX ADMIN — OSELTAMIVIR PHOSPHATE 75 MG: 75 CAPSULE ORAL at 17:15

## 2022-11-29 RX ADMIN — OSELTAMIVIR PHOSPHATE 75 MG: 75 CAPSULE ORAL at 06:20

## 2022-11-29 ASSESSMENT — ENCOUNTER SYMPTOMS
SPUTUM PRODUCTION: 0
FEVER: 0
MYALGIAS: 0
WEAKNESS: 0
NERVOUS/ANXIOUS: 0
CHILLS: 0
PALPITATIONS: 0
DIZZINESS: 0
DOUBLE VISION: 0
DEPRESSION: 0
HEARTBURN: 0
COUGH: 0
WHEEZING: 0
ABDOMINAL PAIN: 0
BLURRED VISION: 0
SHORTNESS OF BREATH: 0

## 2022-11-29 ASSESSMENT — COGNITIVE AND FUNCTIONAL STATUS - GENERAL
SUGGESTED CMS G CODE MODIFIER DAILY ACTIVITY: CH
SUGGESTED CMS G CODE MODIFIER MOBILITY: CH
MOBILITY SCORE: 24
DAILY ACTIVITIY SCORE: 24

## 2022-11-29 NOTE — PROGRESS NOTES
4 Eyes Skin Assessment Completed by JULITA Smart and JULITA Ramos.    Head WDL  Ears WDL  Nose WDL  Mouth WDL  Neck WDL  Breast/Chest WDL  Shoulder Blades WDL  Spine WDL  (R) Arm/Elbow/Hand Abrasion, Discoloration, and Scar  (L) Arm/Elbow/Hand Abrasion  Abdomen WDL  Groin WDL  Scrotum/Coccyx/Buttocks WDL  (R) Leg WDL  (L) Leg WDL  (R) Heel/Foot/Toe WDL  (L) Heel/Foot/Toe WDL          Devices In Places Nasal Cannula      Interventions In Place Elbow Mepilex    Possible Skin Injury Yes     Pictures Uploaded Into Epic N/A  Wound Consult Placed Yes  RN Wound Prevention Protocol Ordered No

## 2022-11-29 NOTE — ASSESSMENT & PLAN NOTE
Patient had significant wheezing at presentation, improved since then.  Resume home inhalers for COPD.  RT protocol with DuoNebs as needed.  Started on p.o. prednisone 40 mg x 5 days.  Has 3 more days left.

## 2022-11-29 NOTE — CARE PLAN
The patient is Stable - Low risk of patient condition declining or worsening    Shift Goals  Clinical Goals: pain mgmt  Patient Goals: pain mgmt  Progress made toward(s) clinical / shift goals:    Problem: Knowledge Deficit - Standard  Goal: Patient and family/care givers will demonstrate understanding of plan of care, disease process/condition, diagnostic tests and medications  Outcome: Progressing     Problem: Pain - Standard  Goal: Alleviation of pain or a reduction in pain to the patient’s comfort goal  Outcome: Progressing     Problem: Skin Integrity  Goal: Skin integrity is maintained or improved  Outcome: Progressing       Patient is not progressing towards the following goals:

## 2022-11-29 NOTE — PROGRESS NOTES
Patient appears comfortable and is currently sleeping with no complaints at this time.  Bedside table and call light are within reach.

## 2022-11-29 NOTE — CARE PLAN
Problem: Bronchoconstriction  Goal: Improve in air movement and diminished wheezing  Description: Target End Date:  2 to 3 days    1.  Implement inhaled treatments  2.  Evaluate and manage medication effects  Outcome: Progressing   Alb QID, improvement in wheezing/SOB.
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
## 2022-11-29 NOTE — DISCHARGE INSTRUCTIONS
Please do not smoke when you are nearby your oxygen devices.    New medications:  Oseltamivir or Tamiflu: take twice daily until bottle is finished for Influenza  Ferrous sulfate: take every other day for iron deficiency anemia  Augmentin or amoxicillin/clavulanate: take twice a day until bottle is finished for pneumonia  Prednisone: take daily in the morning until bottle is finished for COPD exacerbation
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
## 2022-11-29 NOTE — HEART FAILURE PROGRAM
Per OMAR Warner's note 11/28/22, patient is not in acute heart failure on this admission. HF f/u appointment and discharge checklist not indicated.  Patient is being treated for influenza.

## 2022-11-30 NOTE — CARE PLAN
The patient is Stable - Low risk of patient condition declining or worsening    Shift Goals  Clinical Goals: discharge  Patient Goals: go home    Progress made toward(s) clinical / shift goals:    Problem: Skin Integrity  Goal: Skin integrity is maintained or improved  Outcome: Progressing  Note: Skin assessment complete, skin intact, pt encouraged to turn self and ambulate as tolerated. Wound care completed and patient educated on the importance of changing dressings daily.     Problem: Knowledge Deficit - Standard  Goal: Patient and family/care givers will demonstrate understanding of plan of care, disease process/condition, diagnostic tests and medications  Outcome: Met  Note: Knowledge of disease process/condition, treatment plan, diagnostic tests, and medications will improve  Educated patient on plan of care, and pain management.         Patient is not progressing towards the following goals:
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
## 2022-11-30 NOTE — ASSESSMENT & PLAN NOTE
Hgb 10.5, stable since admission.  B12/folate  levels were normal.        Iron studies consistent with mixed AKHIL/ACD.   MD iron replacement protocol ordered, unfortunately there is a shortage  Hence started on oral iron replacement   CHF (congestive heart failure)
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
## 2022-11-30 NOTE — ASSESSMENT & PLAN NOTE
Not in exacerbation during current hospitalization.  Continue Coreg and statin.  Lisinopril and Aldactone held at admission due to ANA ROSA, could be restarted at discharge.

## 2022-11-30 NOTE — ASSESSMENT & PLAN NOTE
This is followed by his primary care provider and patient reportedly is compliant with his medications.  His TSH was 90 in 2018.  TSH during current admission 87 point and free T4 0.5, TPO 30.  Thyroid ultrasound WNL on 11/27/2022.  His home dose of Synthroid is 300 mcg once daily.  After discussion with pharmacy, his dose of Synthroid was increased to 350 mcg once daily and patient advised to follow-up with primary care provider within 4 to 6 weeks after discharge for TSH recheck.
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
## 2022-12-01 LAB
BACTERIA BLD CULT: NORMAL
BACTERIA BLD CULT: NORMAL
SIGNIFICANT IND 70042: NORMAL
SIGNIFICANT IND 70042: NORMAL
SITE SITE: NORMAL
SITE SITE: NORMAL
SOURCE SOURCE: NORMAL
SOURCE SOURCE: NORMAL

## 2023-05-02 NOTE — DIETARY
"Nutrition services: Day 1 of admit.  Phillip Ann is a 62 y.o. male with admitting DX of ANA ROSA. Current hospital problems include: Chronic respiratory failure with hypoxia, HFrEF, abnormal TSH and COPD.     Consult received for BMI <19 and unintentional weight loss of 14-23 lbs in 6 months due to decreased appetite (MST 3). RD met with pt at bedside. Pt reports a good appetite, asking for snacks between meals-RD to add. Pt states his weight fluctuates and has gone down over the last 6 months to \"thyroid issues\". Per chart pt with hypothyroid listed. Pt notes his intake has not changed but his weight ~ 6 months ago was 132 lbs.     Assessment:  Height: 170.2 cm (5' 7\")  Weight: 54.1 kg (119 lb 4.3 oz) via bed scale   Body mass index is 18.68 kg/m²., BMI classification: normal but low for age.   Diet/Intake: Renal % x1 meal.     Evaluation:   Pt with reported wt loss. Per pt he is down 13 lbs (9.8%) in 6 months. Notable wt loss. No wt hx in chart, unable to assess wt hx.   Pt is thin appearing, nutrition focused physical exam preformed. Pt appears nourished with mild muscle wasting noted to temporalis muscle.   Current clinical picture and MD progress notes reviewed   Labs (11/27) K+ 3.5 (L), Ca 7.9 (L)  Meds: folic acid, NS @ 75 mL/hr, senna, Kdur  Skin: no noted staged wounds or pressure injuries   +BM 11/27    Malnutrition Risk: Pt at risk with reported 13 lb (9.8%) wt loss in ~6 months, however pt does not meet criteria per ASPEN guidelines.     Recommendations/Plan:  Renal diet as tolerated  Addition of snacks with all meals    Encourage intake of meals and snacks   Document intake of all meals and snacks as % taken in ADL's to provide interdisciplinary communication across all shifts.   Monitor weight.  Nutrition rep will continue to see patient for ongoing meal and snack preferences.     RD to monitor per department policy     " What Is The Reason For Today's Visit?: Mole Check